# Patient Record
Sex: FEMALE | Race: WHITE | Employment: FULL TIME | URBAN - METROPOLITAN AREA
[De-identification: names, ages, dates, MRNs, and addresses within clinical notes are randomized per-mention and may not be internally consistent; named-entity substitution may affect disease eponyms.]

---

## 2017-02-17 PROBLEM — D69.6 THROMBOCYTOPENIA (HCC): Status: ACTIVE | Noted: 2017-02-17

## 2017-02-17 PROBLEM — D69.6 THROMBOCYTOPENIA: Status: ACTIVE | Noted: 2017-02-17

## 2017-02-27 PROCEDURE — 86304 IMMUNOASSAY TUMOR CA 125: CPT | Performed by: FAMILY MEDICINE

## 2017-03-07 PROCEDURE — 88305 TISSUE EXAM BY PATHOLOGIST: CPT | Performed by: FAMILY MEDICINE

## 2017-03-29 ENCOUNTER — APPOINTMENT (OUTPATIENT)
Dept: GENERAL RADIOLOGY | Facility: HOSPITAL | Age: 37
End: 2017-03-29
Attending: EMERGENCY MEDICINE
Payer: COMMERCIAL

## 2017-03-29 ENCOUNTER — HOSPITAL ENCOUNTER (EMERGENCY)
Facility: HOSPITAL | Age: 37
Discharge: HOME | End: 2017-03-29
Attending: EMERGENCY MEDICINE
Payer: COMMERCIAL

## 2017-03-29 VITALS
RESPIRATION RATE: 22 BRPM | DIASTOLIC BLOOD PRESSURE: 88 MMHG | WEIGHT: 220 LBS | HEART RATE: 94 BPM | OXYGEN SATURATION: 94 % | HEIGHT: 66 IN | TEMPERATURE: 98 F | BODY MASS INDEX: 35.36 KG/M2 | SYSTOLIC BLOOD PRESSURE: 142 MMHG

## 2017-03-29 DIAGNOSIS — F41.9 ANXIETY: ICD-10-CM

## 2017-03-29 DIAGNOSIS — R00.2 PALPITATIONS: Primary | ICD-10-CM

## 2017-03-29 LAB
ALBUMIN SERPL-MCNC: 4.1 G/DL (ref 3.5–4.8)
ALP LIVER SERPL-CCNC: 120 U/L (ref 37–98)
ALT SERPL-CCNC: 39 U/L (ref 14–54)
AST SERPL-CCNC: 27 U/L (ref 15–41)
BASOPHILS # BLD AUTO: 0.04 X10(3) UL (ref 0–0.1)
BASOPHILS NFR BLD AUTO: 0.6 %
BILIRUB SERPL-MCNC: 0.4 MG/DL (ref 0.1–2)
BUN BLD-MCNC: 11 MG/DL (ref 8–20)
CALCIUM BLD-MCNC: 9.3 MG/DL (ref 8.3–10.3)
CHLORIDE: 106 MMOL/L (ref 101–111)
CO2: 27 MMOL/L (ref 22–32)
CREAT BLD-MCNC: 0.85 MG/DL (ref 0.55–1.02)
EOSINOPHIL # BLD AUTO: 0.1 X10(3) UL (ref 0–0.3)
EOSINOPHIL NFR BLD AUTO: 1.5 %
ERYTHROCYTE [DISTWIDTH] IN BLOOD BY AUTOMATED COUNT: 12.3 % (ref 11.5–16)
GLUCOSE BLD-MCNC: 96 MG/DL (ref 70–99)
HCT VFR BLD AUTO: 44.2 % (ref 34–50)
HGB BLD-MCNC: 15.2 G/DL (ref 12–16)
IMMATURE GRANULOCYTE COUNT: 0.01 X10(3) UL (ref 0–1)
IMMATURE GRANULOCYTE RATIO %: 0.2 %
LYMPHOCYTES # BLD AUTO: 1.31 X10(3) UL (ref 0.9–4)
LYMPHOCYTES NFR BLD AUTO: 20 %
M PROTEIN MFR SERPL ELPH: 7.7 G/DL (ref 6.1–8.3)
MCH RBC QN AUTO: 29.8 PG (ref 27–33.2)
MCHC RBC AUTO-ENTMCNC: 34.4 G/DL (ref 31–37)
MCV RBC AUTO: 86.7 FL (ref 81–100)
MONOCYTES # BLD AUTO: 0.5 X10(3) UL (ref 0.1–0.6)
MONOCYTES NFR BLD AUTO: 7.6 %
NEUTROPHIL ABS PRELIM: 4.59 X10 (3) UL (ref 1.3–6.7)
NEUTROPHILS # BLD AUTO: 4.59 X10(3) UL (ref 1.3–6.7)
NEUTROPHILS NFR BLD AUTO: 70.1 %
PLATELET # BLD AUTO: 150 10(3)UL (ref 150–450)
POTASSIUM SERPL-SCNC: 3.9 MMOL/L (ref 3.6–5.1)
RBC # BLD AUTO: 5.1 X10(6)UL (ref 3.8–5.1)
RED CELL DISTRIBUTION WIDTH-SD: 38.7 FL (ref 35.1–46.3)
SODIUM SERPL-SCNC: 139 MMOL/L (ref 136–144)
TROPONIN: <0.046 NG/ML (ref ?–0.05)
TSI SER-ACNC: 1.12 MIU/ML (ref 0.35–5.5)
WBC # BLD AUTO: 6.6 X10(3) UL (ref 4–13)

## 2017-03-29 PROCEDURE — 93010 ELECTROCARDIOGRAM REPORT: CPT

## 2017-03-29 PROCEDURE — 80053 COMPREHEN METABOLIC PANEL: CPT | Performed by: EMERGENCY MEDICINE

## 2017-03-29 PROCEDURE — 93005 ELECTROCARDIOGRAM TRACING: CPT

## 2017-03-29 PROCEDURE — 99284 EMERGENCY DEPT VISIT MOD MDM: CPT

## 2017-03-29 PROCEDURE — 84484 ASSAY OF TROPONIN QUANT: CPT | Performed by: EMERGENCY MEDICINE

## 2017-03-29 PROCEDURE — 84443 ASSAY THYROID STIM HORMONE: CPT | Performed by: EMERGENCY MEDICINE

## 2017-03-29 PROCEDURE — 36415 COLL VENOUS BLD VENIPUNCTURE: CPT

## 2017-03-29 PROCEDURE — 85025 COMPLETE CBC W/AUTO DIFF WBC: CPT | Performed by: EMERGENCY MEDICINE

## 2017-03-29 RX ORDER — ALPRAZOLAM 0.25 MG/1
0.25 TABLET ORAL 3 TIMES DAILY PRN
Qty: 20 TABLET | Refills: 0 | Status: SHIPPED | OUTPATIENT
Start: 2017-03-29 | End: 2017-04-05

## 2017-03-29 NOTE — ED PROVIDER NOTES
Patient Seen in: [facility] Emergency Department    History   Patient presents with:  Arrythmia/Palpitations (cardiovascular)    Stated Complaint: palpitations    HPI    69-year-old female complaining of palpitations this patient is a  s • Breast Cancer Mother 35   • Prostate Cancer Maternal Grandfather    • Heart Surgery Maternal Grandfather      CABG   • Heart Disease Maternal Grandfather    • Dementia Paternal Grandmother    • Eye Problems Paternal Grandmother      macular degeneratio REFLEX TO FREE T4 - Normal   TROPONIN I - Normal   CBC WITH DIFFERENTIAL WITH PLATELET    Narrative: The following orders were created for panel order CBC WITH DIFFERENTIAL WITH PLATELET.   Procedure                               Abnormality         Sta

## 2017-03-29 NOTE — ED INITIAL ASSESSMENT (HPI)
Patient felt palpitations for approximately 5 minutes at about 3PM. She experienced some personal stress prior to this on her way to work. Patient took Claritin D at 12pm for \"sinus stuff. \" Has not taken this medication in a while.  She also drank about 4

## 2017-03-30 LAB
ATRIAL RATE: 115 BPM
P AXIS: 63 DEGREES
P-R INTERVAL: 162 MS
Q-T INTERVAL: 334 MS
QRS DURATION: 84 MS
QTC CALCULATION (BEZET): 462 MS
R AXIS: 33 DEGREES
T AXIS: -4 DEGREES
VENTRICULAR RATE: 115 BPM

## 2018-03-08 PROBLEM — K76.0 HEPATIC STEATOSIS: Status: ACTIVE | Noted: 2018-03-08

## 2020-11-30 ENCOUNTER — HOSPITAL ENCOUNTER (OUTPATIENT)
Age: 40
Discharge: HOME | End: 2020-11-30
Payer: OTHER GOVERNMENT

## 2020-11-30 VITALS
RESPIRATION RATE: 16 BRPM | HEART RATE: 90 BPM | OXYGEN SATURATION: 98 % | SYSTOLIC BLOOD PRESSURE: 132 MMHG | DIASTOLIC BLOOD PRESSURE: 85 MMHG | TEMPERATURE: 98 F

## 2020-11-30 DIAGNOSIS — B34.9 VIRAL SYNDROME: Primary | ICD-10-CM

## 2020-11-30 PROCEDURE — 99202 OFFICE O/P NEW SF 15 MIN: CPT | Performed by: NURSE PRACTITIONER

## 2020-12-01 NOTE — ED INITIAL ASSESSMENT (HPI)
Started with runny nose, congestion, and chills and aches yesterday. Had low grade fever on and off today, took some tylenol. Wants covid testing.

## 2020-12-01 NOTE — ED PROVIDER NOTES
Patient Seen in: [facility]mediate [facility]      History   Patient presents with:  Runny Nose  Nasal Congestion  Fever  Testing    Stated Complaint: testing-congestion,runny nose,cough    57-year-old female presents to the IC with complaints of congestion run testing-congestion,runny nose,cough  Other systems are as noted in HPI. Constitutional and vital signs reviewed. All other systems reviewed and negative except as noted above.     Physical Exam     ED Triage Vitals [11/30/20 1819]   /85   Pulse [id number]  [phone]                Medications Prescribed:  Current Discharge Medication List

## 2021-07-14 PROBLEM — R09.81 NASAL CONGESTION: Status: ACTIVE | Noted: 2021-07-14

## 2021-07-14 PROBLEM — R74.8 ELEVATED LIVER ENZYMES: Status: ACTIVE | Noted: 2021-07-14

## 2022-03-25 PROBLEM — C50.912 DUCTAL CARCINOMA OF LEFT BREAST (HCC): Status: ACTIVE | Noted: 2022-03-25

## 2022-04-03 PROBLEM — Z17.1 MALIGNANT NEOPLASM OF LEFT BREAST IN FEMALE, ESTROGEN RECEPTOR NEGATIVE: Status: ACTIVE | Noted: 2022-04-03

## 2022-04-03 PROBLEM — Z17.1 MALIGNANT NEOPLASM OF LEFT BREAST IN FEMALE, ESTROGEN RECEPTOR NEGATIVE (HCC): Status: ACTIVE | Noted: 2022-04-03

## 2022-04-03 PROBLEM — C50.912 MALIGNANT NEOPLASM OF LEFT BREAST IN FEMALE, ESTROGEN RECEPTOR NEGATIVE (HCC): Status: ACTIVE | Noted: 2022-04-03

## 2022-04-03 PROBLEM — C50.912 MALIGNANT NEOPLASM OF LEFT BREAST IN FEMALE, ESTROGEN RECEPTOR NEGATIVE: Status: ACTIVE | Noted: 2022-04-03

## 2022-08-17 ENCOUNTER — NURSE NAVIGATOR ENCOUNTER (OUTPATIENT)
Dept: HEMATOLOGY/ONCOLOGY | Facility: HOSPITAL | Age: 42
End: 2022-08-17

## 2022-08-17 NOTE — PROGRESS NOTES
Called back in regards to her VM she left about obtaining an appointment with Dr. Rafael Tam. 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 Chan Quinonez. She stated she is on 12 weeks of carboplatin currently. Her surgeon was Dr. Dipesh Rausch at [facility]. I instructed her that we need her medical records faxed to [phone] and her breast images from the last 5 years. She verbalized understanding. Pt was provided with the breast nurse navigators contact information and was encouraged to phone with any other questions or concerns.

## 2022-08-18 ENCOUNTER — NURSE NAVIGATOR ENCOUNTER (OUTPATIENT)
Dept: HEMATOLOGY/ONCOLOGY | Facility: HOSPITAL | Age: 42
End: 2022-08-18

## 2022-08-18 NOTE — PROGRESS NOTES
Called patient in regards to notifying her that I received the medical records she dropped off at the [facility]. She stated she is working on getting her breast images to the [facility] as well. I offered her an appointment with Dr. Martha Blackburn on Wednesday September 7, 2022 at [phone] in the [facility]. She thanked me for the phone call and assistance. Pt was provided with the breast nurse navigators contact information and was encouraged to phone with any other questions or concerns.

## 2022-08-22 ENCOUNTER — TELEPHONE (OUTPATIENT)
Dept: HEMATOLOGY/ONCOLOGY | Age: 42
End: 2022-08-22

## 2022-08-25 ENCOUNTER — TELEPHONE (OUTPATIENT)
Dept: HEMATOLOGY/ONCOLOGY | Age: 42
End: 2022-08-25

## 2022-08-25 NOTE — TELEPHONE ENCOUNTER
Patient calling. Wanted to know if there is an earlier date for consult.    Stated she would go to [facility] as well

## 2022-09-02 ENCOUNTER — NURSE NAVIGATOR ENCOUNTER (OUTPATIENT)
Dept: HEMATOLOGY/ONCOLOGY | Facility: HOSPITAL | Age: 42
End: 2022-09-02

## 2022-09-02 NOTE — PROGRESS NOTES
Spoke with patient on 9/1 regarding bringing in breast images, she brought in bone scan and CT abd/pelvis. Stated that she would bring the breast images on 9/2. Called patient numerous times on 9/2, did not receive these images by end of business day. Her appointment is 9/7 with Dr. Laith Aguilar and 9/13 with Dr. Bel Maxwell.

## 2022-09-07 ENCOUNTER — OFFICE VISIT (OUTPATIENT)
Dept: HEMATOLOGY/ONCOLOGY | Age: 42
End: 2022-09-07
Attending: SPECIALIST
Payer: COMMERCIAL

## 2022-09-07 ENCOUNTER — NURSE ONLY (OUTPATIENT)
Dept: HEMATOLOGY/ONCOLOGY | Age: 42
End: 2022-09-07
Attending: SPECIALIST
Payer: COMMERCIAL

## 2022-09-07 VITALS
RESPIRATION RATE: 18 BRPM | SYSTOLIC BLOOD PRESSURE: 128 MMHG | HEIGHT: 66.93 IN | OXYGEN SATURATION: 98 % | TEMPERATURE: 98 F | HEART RATE: 104 BPM | BODY MASS INDEX: 33.09 KG/M2 | WEIGHT: 210.81 LBS | DIASTOLIC BLOOD PRESSURE: 84 MMHG

## 2022-09-07 DIAGNOSIS — C50.919 TRIPLE NEGATIVE BREAST CANCER (HCC): ICD-10-CM

## 2022-09-07 DIAGNOSIS — Z17.1 MALIGNANT NEOPLASM OF UPPER-OUTER QUADRANT OF LEFT BREAST IN FEMALE, ESTROGEN RECEPTOR NEGATIVE (HCC): ICD-10-CM

## 2022-09-07 DIAGNOSIS — C50.412 MALIGNANT NEOPLASM OF UPPER-OUTER QUADRANT OF LEFT BREAST IN FEMALE, ESTROGEN RECEPTOR NEGATIVE (HCC): ICD-10-CM

## 2022-09-07 DIAGNOSIS — C50.412 MALIGNANT NEOPLASM OF UPPER-OUTER QUADRANT OF LEFT BREAST IN FEMALE, ESTROGEN RECEPTOR NEGATIVE (HCC): Primary | ICD-10-CM

## 2022-09-07 DIAGNOSIS — Z17.1 MALIGNANT NEOPLASM OF UPPER-OUTER QUADRANT OF LEFT BREAST IN FEMALE, ESTROGEN RECEPTOR NEGATIVE (HCC): Primary | ICD-10-CM

## 2022-09-07 PROBLEM — Z17.421 TRIPLE NEGATIVE BREAST CANCER (HCC): Status: ACTIVE | Noted: 2022-09-07

## 2022-09-07 LAB
ALBUMIN SERPL-MCNC: 3.9 G/DL (ref 3.4–5)
ALBUMIN/GLOB SERPL: 1.1 {RATIO} (ref 1–2)
ALP LIVER SERPL-CCNC: 110 U/L
ALT SERPL-CCNC: 47 U/L
ANION GAP SERPL CALC-SCNC: 6 MMOL/L (ref 0–18)
AST SERPL-CCNC: 21 U/L (ref 15–37)
BASOPHILS # BLD AUTO: 0.05 X10(3) UL (ref 0–0.2)
BASOPHILS NFR BLD AUTO: 0.6 %
BILIRUB SERPL-MCNC: 0.4 MG/DL (ref 0.1–2)
BUN BLD-MCNC: 14 MG/DL (ref 7–18)
CALCIUM BLD-MCNC: 9.2 MG/DL (ref 8.5–10.1)
CHLORIDE SERPL-SCNC: 107 MMOL/L (ref 98–112)
CO2 SERPL-SCNC: 28 MMOL/L (ref 21–32)
CREAT BLD-MCNC: 0.97 MG/DL
EOSINOPHIL # BLD AUTO: 1.05 X10(3) UL (ref 0–0.7)
EOSINOPHIL NFR BLD AUTO: 12.5 %
ERYTHROCYTE [DISTWIDTH] IN BLOOD BY AUTOMATED COUNT: 12 %
FASTING STATUS PATIENT QL REPORTED: NO
GFR SERPLBLD BASED ON 1.73 SQ M-ARVRAT: 74 ML/MIN/1.73M2 (ref 60–?)
GLOBULIN PLAS-MCNC: 3.4 G/DL (ref 2.8–4.4)
GLUCOSE BLD-MCNC: 134 MG/DL (ref 70–99)
HCT VFR BLD AUTO: 38.4 %
HGB BLD-MCNC: 12.9 G/DL
IMM GRANULOCYTES # BLD AUTO: 0.03 X10(3) UL (ref 0–1)
IMM GRANULOCYTES NFR BLD: 0.4 %
LYMPHOCYTES # BLD AUTO: 1.06 X10(3) UL (ref 1–4)
LYMPHOCYTES NFR BLD AUTO: 12.6 %
MCH RBC QN AUTO: 34 PG (ref 26–34)
MCHC RBC AUTO-ENTMCNC: 33.6 G/DL (ref 31–37)
MCV RBC AUTO: 101.3 FL
MONOCYTES # BLD AUTO: 0.62 X10(3) UL (ref 0.1–1)
MONOCYTES NFR BLD AUTO: 7.4 %
NEUTROPHILS # BLD AUTO: 5.59 X10 (3) UL (ref 1.5–7.7)
NEUTROPHILS # BLD AUTO: 5.59 X10(3) UL (ref 1.5–7.7)
NEUTROPHILS NFR BLD AUTO: 66.5 %
OSMOLALITY SERPL CALC.SUM OF ELEC: 294 MOSM/KG (ref 275–295)
PLATELET # BLD AUTO: 157 10(3)UL (ref 150–450)
POTASSIUM SERPL-SCNC: 3.8 MMOL/L (ref 3.5–5.1)
PROT SERPL-MCNC: 7.3 G/DL (ref 6.4–8.2)
RBC # BLD AUTO: 3.79 X10(6)UL
SODIUM SERPL-SCNC: 141 MMOL/L (ref 136–145)
TSI SER-ACNC: 0.95 MIU/ML (ref 0.36–3.74)
WBC # BLD AUTO: 8.4 X10(3) UL (ref 4–11)

## 2022-09-07 PROCEDURE — 99245 OFF/OP CONSLTJ NEW/EST HI 55: CPT | Performed by: SPECIALIST

## 2022-09-07 PROCEDURE — 85025 COMPLETE CBC W/AUTO DIFF WBC: CPT

## 2022-09-07 PROCEDURE — 80053 COMPREHEN METABOLIC PANEL: CPT

## 2022-09-07 PROCEDURE — 84443 ASSAY THYROID STIM HORMONE: CPT

## 2022-09-07 PROCEDURE — 36591 DRAW BLOOD OFF VENOUS DEVICE: CPT

## 2022-09-07 RX ORDER — LORAZEPAM 0.5 MG/1
0.5 TABLET ORAL EVERY 4 HOURS PRN
COMMUNITY

## 2022-09-07 RX ORDER — ONDANSETRON 8 MG/1
8 TABLET, ORALLY DISINTEGRATING ORAL EVERY 8 HOURS PRN
COMMUNITY

## 2022-09-07 RX ORDER — PROCHLORPERAZINE MALEATE 10 MG
10 TABLET ORAL EVERY 6 HOURS PRN
COMMUNITY

## 2022-09-07 RX ORDER — PANTOPRAZOLE SODIUM 40 MG/1
40 TABLET, DELAYED RELEASE ORAL DAILY
Qty: 30 TABLET | Refills: 1 | Status: SHIPPED | OUTPATIENT
Start: 2022-09-07

## 2022-09-07 RX ORDER — FAMOTIDINE 20 MG/1
20 TABLET, FILM COATED ORAL DAILY
COMMUNITY

## 2022-09-07 NOTE — PROGRESS NOTES
Patient is here today for follow Consult with Alli Marte for Breast Cancer. Patient denies pain. Medication list,medical history and toxicities were reviewed and updated. Education Record    Learner:  Patient and Mother    Disease / Diagnosis: Consult     Barriers / Limitations:  None   Comments:    Method:  Brief focused, Discussion, Printed material and Reinforcement   Comments:    General Topics:  Medication, Pain, Procedure and Plan of care reviewed   Comments:    Outcome:  Shows understanding   Comments:    Per Alli Marte - Consent for Demetris De La Fuente / Scarlett Trevino signed. [employer] today. Appointment for Chemo and follow up scheduled. AVS provided and follow up reviewed. Patient instructed to call as needed.

## 2022-09-08 ENCOUNTER — TELEPHONE (OUTPATIENT)
Dept: HEMATOLOGY/ONCOLOGY | Facility: HOSPITAL | Age: 42
End: 2022-09-08

## 2022-09-08 NOTE — TELEPHONE ENCOUNTER
Patient's insurance would not approve pegfilgrastim. Called for peer to peer appeal. 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 NCCN guidelines has a category 1 recommendation to initiate growth factor. Meliton Jimenes approved 9/13/22-12/6/22, auth # [id number].

## 2022-09-13 ENCOUNTER — OFFICE VISIT (OUTPATIENT)
Dept: HEMATOLOGY/ONCOLOGY | Facility: HOSPITAL | Age: 42
End: 2022-09-13
Attending: SPECIALIST
Payer: COMMERCIAL

## 2022-09-13 ENCOUNTER — SOCIAL WORK SERVICES (OUTPATIENT)
Dept: HEMATOLOGY/ONCOLOGY | Facility: HOSPITAL | Age: 42
End: 2022-09-13

## 2022-09-13 ENCOUNTER — OFFICE VISIT (OUTPATIENT)
Dept: SURGERY | Facility: CLINIC | Age: 42
End: 2022-09-13
Payer: COMMERCIAL

## 2022-09-13 VITALS
WEIGHT: 208 LBS | RESPIRATION RATE: 16 BRPM | BODY MASS INDEX: 32.65 KG/M2 | HEIGHT: 66.93 IN | TEMPERATURE: 99 F | HEART RATE: 89 BPM | OXYGEN SATURATION: 97 % | DIASTOLIC BLOOD PRESSURE: 84 MMHG | SYSTOLIC BLOOD PRESSURE: 143 MMHG

## 2022-09-13 VITALS
HEART RATE: 78 BPM | OXYGEN SATURATION: 97 % | WEIGHT: 211.38 LBS | RESPIRATION RATE: 16 BRPM | HEIGHT: 66.93 IN | TEMPERATURE: 98 F | SYSTOLIC BLOOD PRESSURE: 134 MMHG | BODY MASS INDEX: 33.18 KG/M2 | DIASTOLIC BLOOD PRESSURE: 84 MMHG

## 2022-09-13 DIAGNOSIS — Z17.1 MALIGNANT NEOPLASM OF UPPER-OUTER QUADRANT OF LEFT BREAST IN FEMALE, ESTROGEN RECEPTOR NEGATIVE (HCC): ICD-10-CM

## 2022-09-13 DIAGNOSIS — C50.919 TRIPLE NEGATIVE BREAST CANCER (HCC): Primary | ICD-10-CM

## 2022-09-13 DIAGNOSIS — C50.412 MALIGNANT NEOPLASM OF UPPER-OUTER QUADRANT OF LEFT BREAST IN FEMALE, ESTROGEN RECEPTOR NEGATIVE (HCC): ICD-10-CM

## 2022-09-13 PROCEDURE — 96375 TX/PRO/DX INJ NEW DRUG ADDON: CPT

## 2022-09-13 PROCEDURE — 96417 CHEMO IV INFUS EACH ADDL SEQ: CPT

## 2022-09-13 PROCEDURE — 99244 OFF/OP CNSLTJ NEW/EST MOD 40: CPT | Performed by: SURGERY

## 2022-09-13 PROCEDURE — 96367 TX/PROPH/DG ADDL SEQ IV INF: CPT

## 2022-09-13 PROCEDURE — 3079F DIAST BP 80-89 MM HG: CPT | Performed by: SURGERY

## 2022-09-13 PROCEDURE — 3008F BODY MASS INDEX DOCD: CPT | Performed by: SURGERY

## 2022-09-13 PROCEDURE — 96411 CHEMO IV PUSH ADDL DRUG: CPT

## 2022-09-13 PROCEDURE — 96413 CHEMO IV INFUSION 1 HR: CPT

## 2022-09-13 PROCEDURE — 3075F SYST BP GE 130 - 139MM HG: CPT | Performed by: SURGERY

## 2022-09-13 RX ORDER — DEXAMETHASONE 4 MG/1
8 TABLET ORAL 2 TIMES DAILY
Qty: 12 TABLET | Refills: 0 | Status: SHIPPED | OUTPATIENT
Start: 2022-09-13 | End: 2022-09-16

## 2022-09-13 RX ORDER — DOXORUBICIN HYDROCHLORIDE 2 MG/ML
60 INJECTION, SOLUTION INTRAVENOUS ONCE
Status: COMPLETED | OUTPATIENT
Start: 2022-09-13 | End: 2022-09-13

## 2022-09-13 RX ADMIN — DOXORUBICIN HYDROCHLORIDE 124 MG: 2 INJECTION, SOLUTION INTRAVENOUS at 15:55:00

## 2022-09-13 NOTE — PROGRESS NOTES
met with patient and Mother Chey in treatment room for introduction and role explanation. No Distress Screen on file. Patient started her treatment at [facility] and has transferred her care to Dr. Maryan Parr. She has had this treatment before, but is new to the [facility]. Patient shared that she is feeling good about her treatment, though struggled prior, which lead to her changing care. Patient states that she has a good support system. Her Boyfriend Phong Blackwood works more [address] [employer] in [facility], so stays there during the week, then comes back to assist at home during the weekends. She reports that she has great support with her Mom (stays with her to help care for Son). Her Son Ortega Traedwell is 8yo and reports that he is coping well. He attends a [email or web address] where the community is supportive and keep an eye out for any needs. Patient works as a , and is currently on [employer]. She is aware to provide Dr Sanford Rich information for any updates needed; contact provided.  educated on Power of  for [employer]; Patient stated that one has already been completed and will submit to [facility] to keep on file. Educated on available resources, providing Social Work Support Packet including [address], [employer], Transportation, and [address] contacts. Educated on [facility] services; patient open to discussion. REGINALD Blevins made aware and entered [facility] Referral for Singh Becker to follow up. Contact provided and family is aware to reach out with any needs. Bringing Estrella Bag given, which patient was grateful for.

## 2022-09-13 NOTE — PATIENT INSTRUCTIONS
Surgeon:         Dr. Joi Grace                                        Tel:         [phone]                                  Fax:        [phone]    Surgery/Procedure:        Immediate bilateral breast reconstruction with tissue expander placement and acellular dermal matrix  2 hours for Dr. Debra Eisenberg portion, 4 hours total, general anesthesia with preoperative pec block, joint with Dr. Rea Almeida, outpatient                                    Hospital:  [facility]: [address] [place], [place] [address]vd, [place], [address]           [phone]  [facility]: [address], [facility], [facility]               [phone]    1. Someone will need to drive you to and from the hospital if your procedure is outpatient. 2.Do not drink alcohol or smoke 24 hours prior to your procedure. 3. Bring a picture ID and your insurance card. 4. You will be contacted by the hospital the day before to confirm the procedure time and location. 5. The hospital will also contact you approximately one week before surgery to schedule your COVID test.     6. Do not take any herbal supplements or blood thinners at least one week before your procedure/surgery. This includes NSAID's (aspirin, baby aspirin, Motrin, Ibuprofen, Aleve, Advil, Naproxen, etc), Plavix, fish oil, vitamin E, turmeric, CoQ10, or green tea supplements, etc. *TYLENOL or acetaminophen is ok to take*    7. PRE-OPERATIVE TESTING: History and physical with medical clearance is REQUIRED within 30 days of the surgery date and is mandatory per Dr. Connie Gallagher. *If this is not done, your surgery will be postponed*  MEDICAL CLEARANCE WITH DR. Asim Cee  CBC  CMP  EKG    8. Please inform us if you develop any Covid-19 like symptoms, test positive or have been exposed for Covid- 19 prior to surgery.      Consent obtained  Photos taken on 9/13/2022

## 2022-09-13 NOTE — PROGRESS NOTES
Pt here for C6D1. Arrives Ambulating independently, accompanied by Family member           Pregnancy screening: Not applicable    Modifications in dose or schedule: No    Drugs/infusions dual verified for appearance and physical integrity. IV pump settings were dual verified: yes     Frequency of blood return and site check throughout administration: Prior to administration, Every 2-3 ml IVP and At completion of therapy   Discharged to Home, Ambulating independently, accompanied by:Family member    Outpatient Oncology Care Plan  Problem list:  fatigue  knowledge deficit  Problems related to:  chemotherapy  Interventions:  provided general teaching  Expected outcomes:  understands plan of care  Progress towards outcome:  making progress    Education Record    Learner:  Patient  Barriers / Limitations:  None  Method:  Discussion and Reinforcement  Outcome:  Shows understanding  Comments: Pt reported a small, almost resolved mouth sore on roof of her mouth. RN observed sore. Looked slightly red but almost healed. MD aware, okay to proceed with treatment today. Pt also requested steroids to take at home due to side effects experienced from treatment. MD prescribed. Pt instructed to  medications at pharmacy. Pt stated she felt \"warm\" on chest after a few minutes into the adriamycin infusion. Adriamycin was paused. CRISTA Harris called. At beside. Pt was not experiencing SOB, pain, or chest tightness. Resumed chemotherapy without incident. Pt tolerated rest of infusion.

## 2022-09-14 ENCOUNTER — OFFICE VISIT (OUTPATIENT)
Dept: HEMATOLOGY/ONCOLOGY | Age: 42
End: 2022-09-14
Attending: SPECIALIST
Payer: COMMERCIAL

## 2022-09-14 ENCOUNTER — NURSE NAVIGATOR ENCOUNTER (OUTPATIENT)
Dept: HEMATOLOGY/ONCOLOGY | Facility: HOSPITAL | Age: 42
End: 2022-09-14

## 2022-09-14 DIAGNOSIS — C50.919 TRIPLE NEGATIVE BREAST CANCER (HCC): Primary | ICD-10-CM

## 2022-09-14 DIAGNOSIS — Z17.1 MALIGNANT NEOPLASM OF UPPER-OUTER QUADRANT OF LEFT BREAST IN FEMALE, ESTROGEN RECEPTOR NEGATIVE (HCC): ICD-10-CM

## 2022-09-14 DIAGNOSIS — C50.412 MALIGNANT NEOPLASM OF UPPER-OUTER QUADRANT OF LEFT BREAST IN FEMALE, ESTROGEN RECEPTOR NEGATIVE (HCC): ICD-10-CM

## 2022-09-14 PROCEDURE — 96372 THER/PROPH/DIAG INJ SC/IM: CPT

## 2022-09-14 NOTE — PROGRESS NOTES
Called patient 1 day post chemo infusion. States that overall she is feeling better than expected, denies any nausea or fatigue. Is happy with her Jud Kaufman experience and the information that was received during infusion yesterday. Appreciative of my phone call. Will follow up with patient in person on Thursday during IV fluid infusion. Phone number provided and encouraged patient to call with any questions or concerns.

## 2022-09-15 ENCOUNTER — OFFICE VISIT (OUTPATIENT)
Dept: HEMATOLOGY/ONCOLOGY | Facility: HOSPITAL | Age: 42
End: 2022-09-15
Attending: SPECIALIST
Payer: COMMERCIAL

## 2022-09-15 ENCOUNTER — NURSE NAVIGATOR ENCOUNTER (OUTPATIENT)
Dept: HEMATOLOGY/ONCOLOGY | Facility: HOSPITAL | Age: 42
End: 2022-09-15

## 2022-09-15 VITALS
BODY MASS INDEX: 34 KG/M2 | DIASTOLIC BLOOD PRESSURE: 77 MMHG | RESPIRATION RATE: 16 BRPM | HEART RATE: 70 BPM | TEMPERATURE: 99 F | OXYGEN SATURATION: 96 % | WEIGHT: 214 LBS | SYSTOLIC BLOOD PRESSURE: 133 MMHG

## 2022-09-15 DIAGNOSIS — Z15.01 BRCA GENE POSITIVE: ICD-10-CM

## 2022-09-15 DIAGNOSIS — Z15.09 BRCA GENE POSITIVE: ICD-10-CM

## 2022-09-15 DIAGNOSIS — C50.919 TRIPLE NEGATIVE BREAST CANCER (HCC): Primary | ICD-10-CM

## 2022-09-15 DIAGNOSIS — Z51.11 CHEMOTHERAPY MANAGEMENT, ENCOUNTER FOR: ICD-10-CM

## 2022-09-15 PROCEDURE — 99215 OFFICE O/P EST HI 40 MIN: CPT | Performed by: SPECIALIST

## 2022-09-15 NOTE — PROGRESS NOTES
Went to infusion to check on patient, she was not there. Called and spoke with her, overall she is feeling better than expected and able to keep food down. States she can \"tell she had chemo\" but feels good. Will check back in with patient in 3 weeks after next chemo.

## 2022-09-20 ENCOUNTER — OFFICE VISIT (OUTPATIENT)
Dept: HEMATOLOGY/ONCOLOGY | Facility: HOSPITAL | Age: 42
End: 2022-09-20
Attending: SPECIALIST
Payer: COMMERCIAL

## 2022-09-29 ENCOUNTER — TELEPHONE (OUTPATIENT)
Dept: SURGERY | Facility: CLINIC | Age: 42
End: 2022-09-29

## 2022-09-29 DIAGNOSIS — C50.919 TRIPLE NEGATIVE BREAST CANCER (HCC): Primary | ICD-10-CM

## 2022-09-29 NOTE — TELEPHONE ENCOUNTER
Calling pt in regards to scheduling surgery. Informed pt that I have 11/28/2022 available at [facility] with Dr. Mary Agosto. Pt verbalized understanding and in agreement with date and location. All questions answered. Encouraged pt to call or [employer] message office with any other questions or concerns.

## 2022-10-03 RX ORDER — DOXORUBICIN HYDROCHLORIDE 2 MG/ML
60 INJECTION, SOLUTION INTRAVENOUS ONCE
Status: CANCELLED | OUTPATIENT
Start: 2022-10-04

## 2022-10-04 ENCOUNTER — OFFICE VISIT (OUTPATIENT)
Dept: HEMATOLOGY/ONCOLOGY | Facility: HOSPITAL | Age: 42
End: 2022-10-04
Attending: SPECIALIST
Payer: COMMERCIAL

## 2022-10-04 VITALS
TEMPERATURE: 97 F | SYSTOLIC BLOOD PRESSURE: 145 MMHG | HEART RATE: 79 BPM | OXYGEN SATURATION: 97 % | DIASTOLIC BLOOD PRESSURE: 80 MMHG | RESPIRATION RATE: 16 BRPM | WEIGHT: 212.81 LBS | HEIGHT: 66.93 IN | BODY MASS INDEX: 33.4 KG/M2

## 2022-10-04 DIAGNOSIS — C50.919 TRIPLE NEGATIVE BREAST CANCER (HCC): Primary | ICD-10-CM

## 2022-10-04 DIAGNOSIS — Z17.1 MALIGNANT NEOPLASM OF UPPER-OUTER QUADRANT OF LEFT BREAST IN FEMALE, ESTROGEN RECEPTOR NEGATIVE (HCC): ICD-10-CM

## 2022-10-04 DIAGNOSIS — C50.412 MALIGNANT NEOPLASM OF UPPER-OUTER QUADRANT OF LEFT BREAST IN FEMALE, ESTROGEN RECEPTOR NEGATIVE (HCC): ICD-10-CM

## 2022-10-04 DIAGNOSIS — Z15.01 BRCA GENE POSITIVE: ICD-10-CM

## 2022-10-04 DIAGNOSIS — Z51.11 CHEMOTHERAPY MANAGEMENT, ENCOUNTER FOR: ICD-10-CM

## 2022-10-04 DIAGNOSIS — Z15.09 BRCA GENE POSITIVE: ICD-10-CM

## 2022-10-04 LAB
ALBUMIN SERPL-MCNC: 3.5 G/DL (ref 3.4–5)
ALBUMIN/GLOB SERPL: 1 {RATIO} (ref 1–2)
ALP LIVER SERPL-CCNC: 88 U/L
ALT SERPL-CCNC: 40 U/L
ANION GAP SERPL CALC-SCNC: 4 MMOL/L (ref 0–18)
AST SERPL-CCNC: 14 U/L (ref 15–37)
BASOPHILS # BLD AUTO: 0.02 X10(3) UL (ref 0–0.2)
BASOPHILS NFR BLD AUTO: 0.5 %
BILIRUB SERPL-MCNC: 0.4 MG/DL (ref 0.1–2)
BUN BLD-MCNC: 15 MG/DL (ref 7–18)
CALCIUM BLD-MCNC: 9 MG/DL (ref 8.5–10.1)
CHLORIDE SERPL-SCNC: 109 MMOL/L (ref 98–112)
CO2 SERPL-SCNC: 27 MMOL/L (ref 21–32)
CREAT BLD-MCNC: 0.95 MG/DL
EOSINOPHIL # BLD AUTO: 0.01 X10(3) UL (ref 0–0.7)
EOSINOPHIL NFR BLD AUTO: 0.2 %
ERYTHROCYTE [DISTWIDTH] IN BLOOD BY AUTOMATED COUNT: 12.4 %
FASTING STATUS PATIENT QL REPORTED: NO
GFR SERPLBLD BASED ON 1.73 SQ M-ARVRAT: 76 ML/MIN/1.73M2 (ref 60–?)
GLOBULIN PLAS-MCNC: 3.4 G/DL (ref 2.8–4.4)
GLUCOSE BLD-MCNC: 142 MG/DL (ref 70–99)
HCT VFR BLD AUTO: 38.4 %
HGB BLD-MCNC: 12.9 G/DL
IMM GRANULOCYTES # BLD AUTO: 0.04 X10(3) UL (ref 0–1)
IMM GRANULOCYTES NFR BLD: 0.9 %
LYMPHOCYTES # BLD AUTO: 0.91 X10(3) UL (ref 1–4)
LYMPHOCYTES NFR BLD AUTO: 20.5 %
MCH RBC QN AUTO: 33.2 PG (ref 26–34)
MCHC RBC AUTO-ENTMCNC: 33.6 G/DL (ref 31–37)
MCV RBC AUTO: 99 FL
MONOCYTES # BLD AUTO: 0.58 X10(3) UL (ref 0.1–1)
MONOCYTES NFR BLD AUTO: 13.1 %
NEUTROPHILS # BLD AUTO: 2.88 X10 (3) UL (ref 1.5–7.7)
NEUTROPHILS # BLD AUTO: 2.88 X10(3) UL (ref 1.5–7.7)
NEUTROPHILS NFR BLD AUTO: 64.8 %
OSMOLALITY SERPL CALC.SUM OF ELEC: 293 MOSM/KG (ref 275–295)
PLATELET # BLD AUTO: 228 10(3)UL (ref 150–450)
POTASSIUM SERPL-SCNC: 3.7 MMOL/L (ref 3.5–5.1)
PROT SERPL-MCNC: 6.9 G/DL (ref 6.4–8.2)
RBC # BLD AUTO: 3.88 X10(6)UL
SODIUM SERPL-SCNC: 140 MMOL/L (ref 136–145)
TSI SER-ACNC: 1.3 MIU/ML (ref 0.36–3.74)
WBC # BLD AUTO: 4.4 X10(3) UL (ref 4–11)

## 2022-10-04 PROCEDURE — 99215 OFFICE O/P EST HI 40 MIN: CPT | Performed by: SPECIALIST

## 2022-10-04 PROCEDURE — 96413 CHEMO IV INFUSION 1 HR: CPT

## 2022-10-04 PROCEDURE — 96375 TX/PRO/DX INJ NEW DRUG ADDON: CPT

## 2022-10-04 PROCEDURE — 96417 CHEMO IV INFUS EACH ADDL SEQ: CPT

## 2022-10-04 PROCEDURE — 96367 TX/PROPH/DG ADDL SEQ IV INF: CPT

## 2022-10-04 PROCEDURE — 80053 COMPREHEN METABOLIC PANEL: CPT

## 2022-10-04 PROCEDURE — 84443 ASSAY THYROID STIM HORMONE: CPT

## 2022-10-04 PROCEDURE — 85025 COMPLETE CBC W/AUTO DIFF WBC: CPT

## 2022-10-04 PROCEDURE — 96411 CHEMO IV PUSH ADDL DRUG: CPT

## 2022-10-04 RX ORDER — DOXORUBICIN HYDROCHLORIDE 2 MG/ML
60 INJECTION, SOLUTION INTRAVENOUS ONCE
Status: COMPLETED | OUTPATIENT
Start: 2022-10-04 | End: 2022-10-04

## 2022-10-04 RX ORDER — DEXAMETHASONE 4 MG/1
8 TABLET ORAL 2 TIMES DAILY
Qty: 12 TABLET | Refills: 0 | Status: SHIPPED | OUTPATIENT
Start: 2022-10-04 | End: 2022-10-07

## 2022-10-04 RX ADMIN — DOXORUBICIN HYDROCHLORIDE 124 MG: 2 INJECTION, SOLUTION INTRAVENOUS at 10:54:00

## 2022-10-04 NOTE — PROGRESS NOTES
Pt here for C7D1 Keytruda, Adriamycin, Cytoxan. Arrives Ambulating independently, accompanied by Family member           Pregnancy screening: Denies possibility of pregnancy    Modifications in dose or schedule: No    Drugs/infusions dual verified for appearance and physical integrity.  IV pump settings were dual verified: yes     Frequency of blood return and site check throughout administration: Prior to administration, Prior to each drug, Every 2-3 ml IVP and At completion of therapy   Discharged to Home, Ambulating independently, accompanied by:Family member    Outpatient Oncology Care Plan  Problem list:  hair loss  fatigue  Problems related to:  chemotherapy  Interventions:  maintain safe environment  monitor for signs/symptoms of infection  Expected outcomes:  adequate sleep/rest  free of infection  optimal lab values  patient supported/coping well  safe in environment  symptoms relieved/minimized  Progress towards outcome:  making progress    Education Record    Learner:  Patient and Family Member  Barriers / Limitations:  None  Method:  Discussion  Outcome:  Shows understanding  Comments:

## 2022-10-05 ENCOUNTER — OFFICE VISIT (OUTPATIENT)
Dept: HEMATOLOGY/ONCOLOGY | Age: 42
End: 2022-10-05
Attending: SPECIALIST
Payer: COMMERCIAL

## 2022-10-05 DIAGNOSIS — C50.919 TRIPLE NEGATIVE BREAST CANCER (HCC): Primary | ICD-10-CM

## 2022-10-05 DIAGNOSIS — Z17.1 MALIGNANT NEOPLASM OF UPPER-OUTER QUADRANT OF LEFT BREAST IN FEMALE, ESTROGEN RECEPTOR NEGATIVE (HCC): ICD-10-CM

## 2022-10-05 DIAGNOSIS — C50.412 MALIGNANT NEOPLASM OF UPPER-OUTER QUADRANT OF LEFT BREAST IN FEMALE, ESTROGEN RECEPTOR NEGATIVE (HCC): ICD-10-CM

## 2022-10-05 PROCEDURE — 96372 THER/PROPH/DIAG INJ SC/IM: CPT

## 2022-10-09 ENCOUNTER — OFFICE VISIT (OUTPATIENT)
Dept: HEMATOLOGY/ONCOLOGY | Facility: HOSPITAL | Age: 42
End: 2022-10-09
Attending: SPECIALIST
Payer: COMMERCIAL

## 2022-10-09 DIAGNOSIS — C50.919 TRIPLE NEGATIVE BREAST CANCER (HCC): Primary | ICD-10-CM

## 2022-10-09 DIAGNOSIS — C50.412 MALIGNANT NEOPLASM OF UPPER-OUTER QUADRANT OF LEFT BREAST IN FEMALE, ESTROGEN RECEPTOR NEGATIVE (HCC): ICD-10-CM

## 2022-10-09 DIAGNOSIS — Z17.1 MALIGNANT NEOPLASM OF UPPER-OUTER QUADRANT OF LEFT BREAST IN FEMALE, ESTROGEN RECEPTOR NEGATIVE (HCC): ICD-10-CM

## 2022-10-11 ENCOUNTER — TELEPHONE (OUTPATIENT)
Dept: GENERAL RADIOLOGY | Facility: HOSPITAL | Age: 42
End: 2022-10-11

## 2022-10-24 ENCOUNTER — TELEPHONE (OUTPATIENT)
Dept: GENERAL RADIOLOGY | Facility: HOSPITAL | Age: 42
End: 2022-10-24

## 2022-10-24 NOTE — TELEPHONE ENCOUNTER
[facility] the breast care coordinator's call. Discussed sentinel lymph node mapping procedure to be done in the nuclear medicine department prior to surgery on Monday, November 28. Procedure explained and flow of the day on surgical date reviewed. All questions answered to the best of my ability. Encouraged Ms. Maria Teresa Sauer to contact Dr. Wanda Pineda if she has questions/concerns prior to surgery date. Charly Cleary verbalized understanding and gratitude for the call.

## 2022-10-25 ENCOUNTER — OFFICE VISIT (OUTPATIENT)
Dept: HEMATOLOGY/ONCOLOGY | Facility: HOSPITAL | Age: 42
End: 2022-10-25
Attending: SPECIALIST
Payer: COMMERCIAL

## 2022-10-25 VITALS
BODY MASS INDEX: 33.74 KG/M2 | SYSTOLIC BLOOD PRESSURE: 131 MMHG | TEMPERATURE: 98 F | RESPIRATION RATE: 18 BRPM | OXYGEN SATURATION: 99 % | WEIGHT: 215 LBS | HEIGHT: 66.93 IN | HEART RATE: 80 BPM | DIASTOLIC BLOOD PRESSURE: 85 MMHG

## 2022-10-25 DIAGNOSIS — Z17.1 MALIGNANT NEOPLASM OF UPPER-OUTER QUADRANT OF LEFT BREAST IN FEMALE, ESTROGEN RECEPTOR NEGATIVE (HCC): ICD-10-CM

## 2022-10-25 DIAGNOSIS — Z51.11 CHEMOTHERAPY MANAGEMENT, ENCOUNTER FOR: ICD-10-CM

## 2022-10-25 DIAGNOSIS — C50.412 MALIGNANT NEOPLASM OF UPPER-OUTER QUADRANT OF LEFT BREAST IN FEMALE, ESTROGEN RECEPTOR NEGATIVE (HCC): ICD-10-CM

## 2022-10-25 DIAGNOSIS — C50.919 TRIPLE NEGATIVE BREAST CANCER (HCC): Primary | ICD-10-CM

## 2022-10-25 DIAGNOSIS — Z15.09 BRCA GENE POSITIVE: ICD-10-CM

## 2022-10-25 DIAGNOSIS — Z15.01 BRCA GENE POSITIVE: ICD-10-CM

## 2022-10-25 DIAGNOSIS — K21.9 GERD WITHOUT ESOPHAGITIS: ICD-10-CM

## 2022-10-25 LAB
ALBUMIN SERPL-MCNC: 3.7 G/DL (ref 3.4–5)
ALBUMIN/GLOB SERPL: 1.1 {RATIO} (ref 1–2)
ALP LIVER SERPL-CCNC: 83 U/L
ALT SERPL-CCNC: 51 U/L
ANION GAP SERPL CALC-SCNC: 8 MMOL/L (ref 0–18)
AST SERPL-CCNC: 23 U/L (ref 15–37)
BASOPHILS # BLD AUTO: 0.02 X10(3) UL (ref 0–0.2)
BASOPHILS NFR BLD AUTO: 0.4 %
BILIRUB SERPL-MCNC: 0.4 MG/DL (ref 0.1–2)
BUN BLD-MCNC: 13 MG/DL (ref 7–18)
CALCIUM BLD-MCNC: 9.2 MG/DL (ref 8.5–10.1)
CHLORIDE SERPL-SCNC: 108 MMOL/L (ref 98–112)
CO2 SERPL-SCNC: 26 MMOL/L (ref 21–32)
CREAT BLD-MCNC: 0.88 MG/DL
EOSINOPHIL # BLD AUTO: 0.04 X10(3) UL (ref 0–0.7)
EOSINOPHIL NFR BLD AUTO: 0.8 %
ERYTHROCYTE [DISTWIDTH] IN BLOOD BY AUTOMATED COUNT: 13.8 %
FASTING STATUS PATIENT QL REPORTED: NO
GFR SERPLBLD BASED ON 1.73 SQ M-ARVRAT: 84 ML/MIN/1.73M2 (ref 60–?)
GLOBULIN PLAS-MCNC: 3.4 G/DL (ref 2.8–4.4)
GLUCOSE BLD-MCNC: 113 MG/DL (ref 70–99)
HCT VFR BLD AUTO: 36.3 %
HGB BLD-MCNC: 12.2 G/DL
IMM GRANULOCYTES # BLD AUTO: 0.02 X10(3) UL (ref 0–1)
IMM GRANULOCYTES NFR BLD: 0.4 %
LYMPHOCYTES # BLD AUTO: 0.76 X10(3) UL (ref 1–4)
LYMPHOCYTES NFR BLD AUTO: 15.7 %
MCH RBC QN AUTO: 32.8 PG (ref 26–34)
MCHC RBC AUTO-ENTMCNC: 33.6 G/DL (ref 31–37)
MCV RBC AUTO: 97.6 FL
MONOCYTES # BLD AUTO: 0.64 X10(3) UL (ref 0.1–1)
MONOCYTES NFR BLD AUTO: 13.3 %
NEUTROPHILS # BLD AUTO: 3.35 X10 (3) UL (ref 1.5–7.7)
NEUTROPHILS # BLD AUTO: 3.35 X10(3) UL (ref 1.5–7.7)
NEUTROPHILS NFR BLD AUTO: 69.4 %
OSMOLALITY SERPL CALC.SUM OF ELEC: 295 MOSM/KG (ref 275–295)
PLATELET # BLD AUTO: 205 10(3)UL (ref 150–450)
POTASSIUM SERPL-SCNC: 3.8 MMOL/L (ref 3.5–5.1)
PROT SERPL-MCNC: 7.1 G/DL (ref 6.4–8.2)
RBC # BLD AUTO: 3.72 X10(6)UL
SODIUM SERPL-SCNC: 142 MMOL/L (ref 136–145)
TSI SER-ACNC: 1.53 MIU/ML (ref 0.36–3.74)
WBC # BLD AUTO: 4.8 X10(3) UL (ref 4–11)

## 2022-10-25 PROCEDURE — 96417 CHEMO IV INFUS EACH ADDL SEQ: CPT

## 2022-10-25 PROCEDURE — 96367 TX/PROPH/DG ADDL SEQ IV INF: CPT

## 2022-10-25 PROCEDURE — 80053 COMPREHEN METABOLIC PANEL: CPT

## 2022-10-25 PROCEDURE — 96413 CHEMO IV INFUSION 1 HR: CPT

## 2022-10-25 PROCEDURE — 85025 COMPLETE CBC W/AUTO DIFF WBC: CPT

## 2022-10-25 PROCEDURE — 84443 ASSAY THYROID STIM HORMONE: CPT

## 2022-10-25 PROCEDURE — 96375 TX/PRO/DX INJ NEW DRUG ADDON: CPT

## 2022-10-25 PROCEDURE — 99215 OFFICE O/P EST HI 40 MIN: CPT | Performed by: SPECIALIST

## 2022-10-25 PROCEDURE — 96411 CHEMO IV PUSH ADDL DRUG: CPT

## 2022-10-25 RX ORDER — DEXAMETHASONE 4 MG/1
8 TABLET ORAL 2 TIMES DAILY
Qty: 12 TABLET | Refills: 0 | Status: SHIPPED | OUTPATIENT
Start: 2022-10-25 | End: 2022-10-28

## 2022-10-25 RX ORDER — DOXORUBICIN HYDROCHLORIDE 2 MG/ML
60 INJECTION, SOLUTION INTRAVENOUS ONCE
Status: CANCELLED | OUTPATIENT
Start: 2022-10-25

## 2022-10-25 RX ORDER — DOXORUBICIN HYDROCHLORIDE 2 MG/ML
60 INJECTION, SOLUTION INTRAVENOUS ONCE
Status: COMPLETED | OUTPATIENT
Start: 2022-10-25 | End: 2022-10-25

## 2022-10-25 RX ADMIN — DOXORUBICIN HYDROCHLORIDE 124 MG: 2 INJECTION, SOLUTION INTRAVENOUS at 11:22:00

## 2022-10-25 NOTE — PROGRESS NOTES
Pt here for C8D1 Keytruda/Benton/Cytoxan. Arrives Ambulating independently, accompanied by Family member           Pregnancy screening: Denies possibility of pregnancy    Modifications in dose or schedule: No    Drugs/infusions dual verified for appearance and physical integrity. IV pump settings were dual verified: yes     Frequency of blood return and site check throughout administration: Prior to administration, Every 2-3 ml IVP and At completion of therapy   Discharged to Home, Ambulating independently, accompanied by:Family member    Outpatient Oncology Care Plan  Problem list:  fatigue  Problems related to:  chemotherapy  disease/disease progression  side effect of treatment  Interventions:  maintain safe environment  encourage activity as tolerated  promoted rest  provided general teaching  Expected outcomes:  adequate sleep/rest  safe in environment  symptoms relieved/minimized  understands plan of care  Progress towards outcome:  making progress    Education Record    Learner:  Patient  Barriers / Limitations:  None  Method:  Brief focused and Reinforcement  Outcome:  Shows understanding  Comments: Patient tolerated chemotherapy and discharged in stable condition.

## 2022-10-26 ENCOUNTER — OFFICE VISIT (OUTPATIENT)
Dept: HEMATOLOGY/ONCOLOGY | Age: 42
End: 2022-10-26
Attending: SPECIALIST
Payer: COMMERCIAL

## 2022-10-26 DIAGNOSIS — C50.412 MALIGNANT NEOPLASM OF UPPER-OUTER QUADRANT OF LEFT BREAST IN FEMALE, ESTROGEN RECEPTOR NEGATIVE (HCC): ICD-10-CM

## 2022-10-26 DIAGNOSIS — C50.919 TRIPLE NEGATIVE BREAST CANCER (HCC): Primary | ICD-10-CM

## 2022-10-26 DIAGNOSIS — Z17.1 MALIGNANT NEOPLASM OF UPPER-OUTER QUADRANT OF LEFT BREAST IN FEMALE, ESTROGEN RECEPTOR NEGATIVE (HCC): ICD-10-CM

## 2022-10-26 PROCEDURE — 96372 THER/PROPH/DIAG INJ SC/IM: CPT

## 2022-11-15 ENCOUNTER — OFFICE VISIT (OUTPATIENT)
Dept: HEMATOLOGY/ONCOLOGY | Facility: HOSPITAL | Age: 42
End: 2022-11-15
Attending: SPECIALIST
Payer: COMMERCIAL

## 2022-11-15 VITALS
OXYGEN SATURATION: 96 % | BODY MASS INDEX: 34.16 KG/M2 | SYSTOLIC BLOOD PRESSURE: 132 MMHG | HEART RATE: 83 BPM | RESPIRATION RATE: 18 BRPM | TEMPERATURE: 98 F | WEIGHT: 217.63 LBS | DIASTOLIC BLOOD PRESSURE: 81 MMHG | HEIGHT: 66.93 IN

## 2022-11-15 DIAGNOSIS — C50.412 MALIGNANT NEOPLASM OF UPPER-OUTER QUADRANT OF LEFT BREAST IN FEMALE, ESTROGEN RECEPTOR NEGATIVE (HCC): ICD-10-CM

## 2022-11-15 DIAGNOSIS — K21.9 GERD WITHOUT ESOPHAGITIS: ICD-10-CM

## 2022-11-15 DIAGNOSIS — Z17.1 MALIGNANT NEOPLASM OF UPPER-OUTER QUADRANT OF LEFT BREAST IN FEMALE, ESTROGEN RECEPTOR NEGATIVE (HCC): ICD-10-CM

## 2022-11-15 DIAGNOSIS — Z15.01 BRCA GENE POSITIVE: ICD-10-CM

## 2022-11-15 DIAGNOSIS — C50.919 TRIPLE NEGATIVE BREAST CANCER (HCC): Primary | ICD-10-CM

## 2022-11-15 DIAGNOSIS — Z51.12 ENCOUNTER FOR ANTINEOPLASTIC IMMUNOTHERAPY: ICD-10-CM

## 2022-11-15 DIAGNOSIS — Z15.09 BRCA GENE POSITIVE: ICD-10-CM

## 2022-11-15 LAB
ALBUMIN SERPL-MCNC: 3.6 G/DL (ref 3.4–5)
ALBUMIN/GLOB SERPL: 1.1 {RATIO} (ref 1–2)
ALP LIVER SERPL-CCNC: 93 U/L
ALT SERPL-CCNC: 46 U/L
ANION GAP SERPL CALC-SCNC: 6 MMOL/L (ref 0–18)
AST SERPL-CCNC: 16 U/L (ref 15–37)
BASOPHILS # BLD AUTO: 0.02 X10(3) UL (ref 0–0.2)
BASOPHILS NFR BLD AUTO: 0.5 %
BILIRUB SERPL-MCNC: 0.4 MG/DL (ref 0.1–2)
BUN BLD-MCNC: 14 MG/DL (ref 7–18)
CALCIUM BLD-MCNC: 9.3 MG/DL (ref 8.5–10.1)
CHLORIDE SERPL-SCNC: 109 MMOL/L (ref 98–112)
CO2 SERPL-SCNC: 26 MMOL/L (ref 21–32)
CREAT BLD-MCNC: 0.91 MG/DL
EOSINOPHIL # BLD AUTO: 0.03 X10(3) UL (ref 0–0.7)
EOSINOPHIL NFR BLD AUTO: 0.7 %
ERYTHROCYTE [DISTWIDTH] IN BLOOD BY AUTOMATED COUNT: 15.9 %
GFR SERPLBLD BASED ON 1.73 SQ M-ARVRAT: 80 ML/MIN/1.73M2 (ref 60–?)
GLOBULIN PLAS-MCNC: 3.4 G/DL (ref 2.8–4.4)
GLUCOSE BLD-MCNC: 124 MG/DL (ref 70–99)
HCT VFR BLD AUTO: 33.7 %
HGB BLD-MCNC: 11.4 G/DL
IMM GRANULOCYTES # BLD AUTO: 0.02 X10(3) UL (ref 0–1)
IMM GRANULOCYTES NFR BLD: 0.5 %
LYMPHOCYTES # BLD AUTO: 0.65 X10(3) UL (ref 1–4)
LYMPHOCYTES NFR BLD AUTO: 15.9 %
MCH RBC QN AUTO: 33.1 PG (ref 26–34)
MCHC RBC AUTO-ENTMCNC: 33.8 G/DL (ref 31–37)
MCV RBC AUTO: 98 FL
MONOCYTES # BLD AUTO: 0.58 X10(3) UL (ref 0.1–1)
MONOCYTES NFR BLD AUTO: 14.2 %
NEUTROPHILS # BLD AUTO: 2.78 X10 (3) UL (ref 1.5–7.7)
NEUTROPHILS # BLD AUTO: 2.78 X10(3) UL (ref 1.5–7.7)
NEUTROPHILS NFR BLD AUTO: 68.2 %
OSMOLALITY SERPL CALC.SUM OF ELEC: 294 MOSM/KG (ref 275–295)
PLATELET # BLD AUTO: 210 10(3)UL (ref 150–450)
POTASSIUM SERPL-SCNC: 3.6 MMOL/L (ref 3.5–5.1)
PROT SERPL-MCNC: 7 G/DL (ref 6.4–8.2)
RBC # BLD AUTO: 3.44 X10(6)UL
SODIUM SERPL-SCNC: 141 MMOL/L (ref 136–145)
TSI SER-ACNC: 1.19 MIU/ML (ref 0.36–3.74)
WBC # BLD AUTO: 4.1 X10(3) UL (ref 4–11)

## 2022-11-15 PROCEDURE — 85025 COMPLETE CBC W/AUTO DIFF WBC: CPT

## 2022-11-15 PROCEDURE — 84443 ASSAY THYROID STIM HORMONE: CPT

## 2022-11-15 PROCEDURE — 80053 COMPREHEN METABOLIC PANEL: CPT

## 2022-11-15 PROCEDURE — 99215 OFFICE O/P EST HI 40 MIN: CPT | Performed by: SPECIALIST

## 2022-11-15 PROCEDURE — 96413 CHEMO IV INFUSION 1 HR: CPT

## 2022-11-15 RX ORDER — PANTOPRAZOLE SODIUM 40 MG/1
40 TABLET, DELAYED RELEASE ORAL DAILY
Qty: 30 TABLET | Refills: 5 | Status: SHIPPED | OUTPATIENT
Start: 2022-11-15

## 2022-11-15 NOTE — PROGRESS NOTES
Patient is here today for follow up with Iban Bañuelos for Triple Negative Breast Cancer. C9D1 Keytruda therapy. Patient denies pain. Denies adverse side effects to Immunotherapy. No rash, itching, cough, shortness of breath, diarrhea or other GI issues. Medication list,medical history and toxicities were reviewed and updated. Education Record    Learner:  Patient      Disease / Diagnosis: Triple Negative Breast Cancer    Barriers / Limitations:  None   Comments:    Method:  Brief focused, Discussion, Printed material and Reinforcement   Comments:    General Topics:  Medication, Pain, Procedure and Plan of care reviewed   Comments:    Outcome:  Shows understanding   Comments:    Post MD visit patient sent to waiting room. Treating RN notified. AVS provided and follow up reviewed. Patient instructed to call as needed.

## 2022-11-15 NOTE — PROGRESS NOTES
Pt here for C9D1. Arrives Ambulating independently, accompanied by Self           Pregnancy screening: Not applicable    Modifications in dose or schedule: No    Drugs/infusions dual verified for appearance and physical integrity. IV pump settings were dual verified: yes     Frequency of blood return and site check throughout administration: Prior to administration, at completion of therapy  Discharged to Home, Ambulating independently, accompanied by:Self    Outpatient Oncology Care Plan  Problem list:  fatigue  knowledge deficit  Problems related to:  chemotherapy  Interventions:  provided general teaching  Expected outcomes:  understands plan of care  Progress towards outcome:  making progress    Education Record    Learner:  Patient  Barriers / Limitations:  None  Method:  Discussion and Reinforcement  Outcome:  Shows understanding  Comments: Pt tolerated infusion. Pt left in stable condition.

## 2022-11-18 ENCOUNTER — OFFICE VISIT (OUTPATIENT)
Dept: HEMATOLOGY/ONCOLOGY | Facility: HOSPITAL | Age: 42
End: 2022-11-18
Attending: SPECIALIST
Payer: COMMERCIAL

## 2022-11-18 VITALS
HEART RATE: 87 BPM | DIASTOLIC BLOOD PRESSURE: 84 MMHG | WEIGHT: 218 LBS | RESPIRATION RATE: 16 BRPM | BODY MASS INDEX: 34.21 KG/M2 | OXYGEN SATURATION: 98 % | SYSTOLIC BLOOD PRESSURE: 138 MMHG | HEIGHT: 66.93 IN | TEMPERATURE: 98 F

## 2022-11-18 DIAGNOSIS — Z15.01 BRCA GENE POSITIVE: ICD-10-CM

## 2022-11-18 DIAGNOSIS — C50.919 TRIPLE NEGATIVE BREAST CANCER (HCC): Primary | ICD-10-CM

## 2022-11-18 DIAGNOSIS — K21.9 GERD WITHOUT ESOPHAGITIS: ICD-10-CM

## 2022-11-18 DIAGNOSIS — Z15.09 BRCA GENE POSITIVE: ICD-10-CM

## 2022-11-18 PROCEDURE — 99214 OFFICE O/P EST MOD 30 MIN: CPT | Performed by: SPECIALIST

## 2022-11-21 DIAGNOSIS — C50.919 TRIPLE NEGATIVE BREAST CANCER (HCC): Primary | ICD-10-CM

## 2022-11-22 ENCOUNTER — NURSE NAVIGATOR ENCOUNTER (OUTPATIENT)
Dept: HEMATOLOGY/ONCOLOGY | Facility: HOSPITAL | Age: 42
End: 2022-11-22

## 2022-11-22 NOTE — PROGRESS NOTES
Patient is scheduled for mastectomy with Dr. Marie Liu, and cancelled portion with Dr. Samira Solano. Called her to confirm this and then also cancelled mastectomy class, as this is for patient's with an [address]. She is aware.

## 2022-11-23 ENCOUNTER — APPOINTMENT (OUTPATIENT)
Dept: HEMATOLOGY/ONCOLOGY | Facility: HOSPITAL | Age: 42
End: 2022-11-23
Attending: SPECIALIST
Payer: COMMERCIAL

## 2022-11-25 ENCOUNTER — LAB ENCOUNTER (OUTPATIENT)
Dept: LAB | Age: 42
End: 2022-11-25
Attending: SURGERY
Payer: COMMERCIAL

## 2022-11-25 DIAGNOSIS — Z20.822 ENCOUNTER FOR PREPROCEDURE SCREENING LABORATORY TESTING FOR COVID-19: ICD-10-CM

## 2022-11-25 DIAGNOSIS — Z01.812 ENCOUNTER FOR PREPROCEDURE SCREENING LABORATORY TESTING FOR COVID-19: ICD-10-CM

## 2022-11-26 LAB — SARS-COV-2 RNA RESP QL NAA+PROBE: NOT DETECTED

## 2022-11-27 ENCOUNTER — ANESTHESIA EVENT (OUTPATIENT)
Dept: SURGERY | Facility: HOSPITAL | Age: 42
End: 2022-11-27
Payer: COMMERCIAL

## 2022-11-28 ENCOUNTER — ANESTHESIA (OUTPATIENT)
Dept: SURGERY | Facility: HOSPITAL | Age: 42
End: 2022-11-28
Payer: COMMERCIAL

## 2022-11-28 ENCOUNTER — HOSPITAL ENCOUNTER (OUTPATIENT)
Facility: HOSPITAL | Age: 42
Discharge: HOME | End: 2022-11-29
Attending: SURGERY | Admitting: SURGERY
Payer: COMMERCIAL

## 2022-11-28 ENCOUNTER — HOSPITAL ENCOUNTER (OUTPATIENT)
Dept: NUCLEAR MEDICINE | Facility: HOSPITAL | Age: 42
Discharge: HOME | End: 2022-11-28
Attending: SURGERY
Payer: COMMERCIAL

## 2022-11-28 ENCOUNTER — APPOINTMENT (OUTPATIENT)
Dept: MAMMOGRAPHY | Facility: HOSPITAL | Age: 42
End: 2022-11-28
Attending: SURGERY
Payer: COMMERCIAL

## 2022-11-28 DIAGNOSIS — Z20.822 ENCOUNTER FOR PREPROCEDURE SCREENING LABORATORY TESTING FOR COVID-19: Primary | ICD-10-CM

## 2022-11-28 DIAGNOSIS — C50.412 MALIGNANT NEOPLASM OF UPPER-OUTER QUADRANT OF LEFT BREAST IN FEMALE, ESTROGEN RECEPTOR NEGATIVE (HCC): ICD-10-CM

## 2022-11-28 DIAGNOSIS — Z17.1 MALIGNANT NEOPLASM OF UPPER-OUTER QUADRANT OF LEFT BREAST IN FEMALE, ESTROGEN RECEPTOR NEGATIVE (HCC): ICD-10-CM

## 2022-11-28 DIAGNOSIS — Z09 SURGERY FOLLOW-UP: ICD-10-CM

## 2022-11-28 DIAGNOSIS — Z01.812 ENCOUNTER FOR PREPROCEDURE SCREENING LABORATORY TESTING FOR COVID-19: Primary | ICD-10-CM

## 2022-11-28 PROCEDURE — 88307 TISSUE EXAM BY PATHOLOGIST: CPT | Performed by: SURGERY

## 2022-11-28 PROCEDURE — 07B60ZX EXCISION OF LEFT AXILLARY LYMPHATIC, OPEN APPROACH, DIAGNOSTIC: ICD-10-PCS | Performed by: SURGERY

## 2022-11-28 PROCEDURE — 76098 X-RAY EXAM SURGICAL SPECIMEN: CPT | Performed by: SURGERY

## 2022-11-28 PROCEDURE — 0HTV0ZZ RESECTION OF BILATERAL BREAST, OPEN APPROACH: ICD-10-PCS | Performed by: SURGERY

## 2022-11-28 PROCEDURE — 78195 LYMPH SYSTEM IMAGING: CPT | Performed by: SURGERY

## 2022-11-28 PROCEDURE — 3E0W3KZ INTRODUCTION OF OTHER DIAGNOSTIC SUBSTANCE INTO LYMPHATICS, PERCUTANEOUS APPROACH: ICD-10-PCS | Performed by: SURGERY

## 2022-11-28 PROCEDURE — BH01ZZZ PLAIN RADIOGRAPHY OF LEFT BREAST: ICD-10-PCS | Performed by: SURGERY

## 2022-11-28 PROCEDURE — 88331 PATH CONSLTJ SURG 1 BLK 1SPC: CPT | Performed by: SURGERY

## 2022-11-28 RX ORDER — HYDROCODONE BITARTRATE AND ACETAMINOPHEN 5; 325 MG/1; MG/1
1 TABLET ORAL ONCE AS NEEDED
Status: DISCONTINUED | OUTPATIENT
Start: 2022-11-28 | End: 2022-11-28 | Stop reason: HOSPADM

## 2022-11-28 RX ORDER — DIPHENHYDRAMINE HCL 25 MG
25 CAPSULE ORAL NIGHTLY PRN
Status: DISCONTINUED | OUTPATIENT
Start: 2022-11-28 | End: 2022-11-29

## 2022-11-28 RX ORDER — ROCURONIUM BROMIDE 10 MG/ML
INJECTION, SOLUTION INTRAVENOUS AS NEEDED
Status: DISCONTINUED | OUTPATIENT
Start: 2022-11-28 | End: 2022-11-28 | Stop reason: SURG

## 2022-11-28 RX ORDER — ONDANSETRON 2 MG/ML
INJECTION INTRAMUSCULAR; INTRAVENOUS
Status: COMPLETED
Start: 2022-11-28 | End: 2022-11-28

## 2022-11-28 RX ORDER — MORPHINE SULFATE 2 MG/ML
1 INJECTION, SOLUTION INTRAMUSCULAR; INTRAVENOUS EVERY 2 HOUR PRN
Status: DISCONTINUED | OUTPATIENT
Start: 2022-11-28 | End: 2022-11-29

## 2022-11-28 RX ORDER — POLYETHYLENE GLYCOL 3350 17 G/17G
17 POWDER, FOR SOLUTION ORAL DAILY PRN
Status: DISCONTINUED | OUTPATIENT
Start: 2022-11-28 | End: 2022-11-29

## 2022-11-28 RX ORDER — ACETAMINOPHEN 500 MG
1000 TABLET ORAL ONCE AS NEEDED
Status: DISCONTINUED | OUTPATIENT
Start: 2022-11-28 | End: 2022-11-28 | Stop reason: HOSPADM

## 2022-11-28 RX ORDER — NALOXONE HYDROCHLORIDE 0.4 MG/ML
80 INJECTION, SOLUTION INTRAMUSCULAR; INTRAVENOUS; SUBCUTANEOUS AS NEEDED
Status: DISCONTINUED | OUTPATIENT
Start: 2022-11-28 | End: 2022-11-28 | Stop reason: HOSPADM

## 2022-11-28 RX ORDER — SODIUM CHLORIDE, SODIUM LACTATE, POTASSIUM CHLORIDE, CALCIUM CHLORIDE 600; 310; 30; 20 MG/100ML; MG/100ML; MG/100ML; MG/100ML
INJECTION, SOLUTION INTRAVENOUS CONTINUOUS
Status: DISCONTINUED | OUTPATIENT
Start: 2022-11-28 | End: 2022-11-28 | Stop reason: HOSPADM

## 2022-11-28 RX ORDER — ONDANSETRON 2 MG/ML
4 INJECTION INTRAMUSCULAR; INTRAVENOUS EVERY 6 HOURS PRN
Status: DISCONTINUED | OUTPATIENT
Start: 2022-11-28 | End: 2022-11-28 | Stop reason: HOSPADM

## 2022-11-28 RX ORDER — DIAZEPAM 5 MG/1
5 TABLET ORAL AS NEEDED
Status: DISCONTINUED | OUTPATIENT
Start: 2022-11-28 | End: 2022-11-28 | Stop reason: HOSPADM

## 2022-11-28 RX ORDER — PANTOPRAZOLE SODIUM 40 MG/1
40 TABLET, DELAYED RELEASE ORAL DAILY
Status: DISCONTINUED | OUTPATIENT
Start: 2022-11-29 | End: 2022-11-29

## 2022-11-28 RX ORDER — HYDROMORPHONE HYDROCHLORIDE 1 MG/ML
0.2 INJECTION, SOLUTION INTRAMUSCULAR; INTRAVENOUS; SUBCUTANEOUS EVERY 5 MIN PRN
Status: DISCONTINUED | OUTPATIENT
Start: 2022-11-28 | End: 2022-11-28 | Stop reason: HOSPADM

## 2022-11-28 RX ORDER — SODIUM PHOSPHATE, DIBASIC AND SODIUM PHOSPHATE, MONOBASIC 7; 19 G/133ML; G/133ML
1 ENEMA RECTAL ONCE AS NEEDED
Status: DISCONTINUED | OUTPATIENT
Start: 2022-11-28 | End: 2022-11-29

## 2022-11-28 RX ORDER — MORPHINE SULFATE 2 MG/ML
2 INJECTION, SOLUTION INTRAMUSCULAR; INTRAVENOUS EVERY 2 HOUR PRN
Status: DISCONTINUED | OUTPATIENT
Start: 2022-11-28 | End: 2022-11-29

## 2022-11-28 RX ORDER — BISACODYL 10 MG
10 SUPPOSITORY, RECTAL RECTAL
Status: DISCONTINUED | OUTPATIENT
Start: 2022-11-28 | End: 2022-11-29

## 2022-11-28 RX ORDER — ISOSULFAN BLUE 50 MG/5ML
INJECTION, SOLUTION SUBCUTANEOUS AS NEEDED
Status: DISCONTINUED | OUTPATIENT
Start: 2022-11-28 | End: 2022-11-28 | Stop reason: HOSPADM

## 2022-11-28 RX ORDER — MELATONIN
3 NIGHTLY PRN
Status: DISCONTINUED | OUTPATIENT
Start: 2022-11-28 | End: 2022-11-29

## 2022-11-28 RX ORDER — METOCLOPRAMIDE HYDROCHLORIDE 5 MG/ML
10 INJECTION INTRAMUSCULAR; INTRAVENOUS EVERY 6 HOURS PRN
Status: DISCONTINUED | OUTPATIENT
Start: 2022-11-28 | End: 2022-11-29

## 2022-11-28 RX ORDER — NEOSTIGMINE METHYLSULFATE 1 MG/ML
INJECTION, SOLUTION INTRAVENOUS AS NEEDED
Status: DISCONTINUED | OUTPATIENT
Start: 2022-11-28 | End: 2022-11-28 | Stop reason: SURG

## 2022-11-28 RX ORDER — LIDOCAINE HYDROCHLORIDE 10 MG/ML
INJECTION, SOLUTION EPIDURAL; INFILTRATION; INTRACAUDAL; PERINEURAL AS NEEDED
Status: DISCONTINUED | OUTPATIENT
Start: 2022-11-28 | End: 2022-11-28 | Stop reason: SURG

## 2022-11-28 RX ORDER — HYDROCODONE BITARTRATE AND ACETAMINOPHEN 5; 325 MG/1; MG/1
2 TABLET ORAL ONCE AS NEEDED
Status: DISCONTINUED | OUTPATIENT
Start: 2022-11-28 | End: 2022-11-28 | Stop reason: HOSPADM

## 2022-11-28 RX ORDER — KETOROLAC TROMETHAMINE 30 MG/ML
30 INJECTION, SOLUTION INTRAMUSCULAR; INTRAVENOUS EVERY 6 HOURS PRN
Status: DISCONTINUED | OUTPATIENT
Start: 2022-11-28 | End: 2022-11-29

## 2022-11-28 RX ORDER — HEPARIN SODIUM 5000 [USP'U]/ML
5000 INJECTION, SOLUTION INTRAVENOUS; SUBCUTANEOUS ONCE
Status: COMPLETED | OUTPATIENT
Start: 2022-11-28 | End: 2022-11-28

## 2022-11-28 RX ORDER — ACETAMINOPHEN 500 MG
1000 TABLET ORAL ONCE
Status: DISCONTINUED | OUTPATIENT
Start: 2022-11-28 | End: 2022-11-28 | Stop reason: HOSPADM

## 2022-11-28 RX ORDER — LORAZEPAM 2 MG/ML
1 INJECTION INTRAMUSCULAR ONCE
Status: DISCONTINUED | OUTPATIENT
Start: 2022-11-28 | End: 2022-11-29

## 2022-11-28 RX ORDER — SODIUM CHLORIDE, SODIUM LACTATE, POTASSIUM CHLORIDE, CALCIUM CHLORIDE 600; 310; 30; 20 MG/100ML; MG/100ML; MG/100ML; MG/100ML
INJECTION, SOLUTION INTRAVENOUS CONTINUOUS
Status: DISCONTINUED | OUTPATIENT
Start: 2022-11-28 | End: 2022-11-29

## 2022-11-28 RX ORDER — ACETAMINOPHEN 500 MG
500 TABLET ORAL EVERY 6 HOURS PRN
Status: DISCONTINUED | OUTPATIENT
Start: 2022-11-28 | End: 2022-11-29

## 2022-11-28 RX ORDER — MORPHINE SULFATE 2 MG/ML
0.5 INJECTION, SOLUTION INTRAMUSCULAR; INTRAVENOUS EVERY 2 HOUR PRN
Status: DISCONTINUED | OUTPATIENT
Start: 2022-11-28 | End: 2022-11-29

## 2022-11-28 RX ORDER — HYDROMORPHONE HYDROCHLORIDE 1 MG/ML
0.6 INJECTION, SOLUTION INTRAMUSCULAR; INTRAVENOUS; SUBCUTANEOUS EVERY 5 MIN PRN
Status: DISCONTINUED | OUTPATIENT
Start: 2022-11-28 | End: 2022-11-28 | Stop reason: HOSPADM

## 2022-11-28 RX ORDER — ONDANSETRON 2 MG/ML
4 INJECTION INTRAMUSCULAR; INTRAVENOUS EVERY 6 HOURS PRN
Status: DISCONTINUED | OUTPATIENT
Start: 2022-11-28 | End: 2022-11-29

## 2022-11-28 RX ORDER — DEXAMETHASONE SODIUM PHOSPHATE 4 MG/ML
VIAL (ML) INJECTION AS NEEDED
Status: DISCONTINUED | OUTPATIENT
Start: 2022-11-28 | End: 2022-11-28 | Stop reason: SURG

## 2022-11-28 RX ORDER — CLINDAMYCIN PHOSPHATE 900 MG/50ML
900 INJECTION INTRAVENOUS ONCE
Status: COMPLETED | OUTPATIENT
Start: 2022-11-28 | End: 2022-11-28

## 2022-11-28 RX ORDER — HYDROMORPHONE HYDROCHLORIDE 1 MG/ML
INJECTION, SOLUTION INTRAMUSCULAR; INTRAVENOUS; SUBCUTANEOUS
Status: DISCONTINUED
Start: 2022-11-28 | End: 2022-11-28 | Stop reason: WASHOUT

## 2022-11-28 RX ORDER — SENNOSIDES 8.6 MG
17.2 TABLET ORAL NIGHTLY PRN
Status: DISCONTINUED | OUTPATIENT
Start: 2022-11-28 | End: 2022-11-29

## 2022-11-28 RX ORDER — HYDROMORPHONE HYDROCHLORIDE 1 MG/ML
0.4 INJECTION, SOLUTION INTRAMUSCULAR; INTRAVENOUS; SUBCUTANEOUS EVERY 5 MIN PRN
Status: DISCONTINUED | OUTPATIENT
Start: 2022-11-28 | End: 2022-11-28 | Stop reason: HOSPADM

## 2022-11-28 RX ORDER — KETOROLAC TROMETHAMINE 15 MG/ML
15 INJECTION, SOLUTION INTRAMUSCULAR; INTRAVENOUS EVERY 6 HOURS PRN
Status: DISCONTINUED | OUTPATIENT
Start: 2022-11-28 | End: 2022-11-29

## 2022-11-28 RX ORDER — METOCLOPRAMIDE 10 MG/1
10 TABLET ORAL EVERY 6 HOURS PRN
Status: DISCONTINUED | OUTPATIENT
Start: 2022-11-28 | End: 2022-11-29

## 2022-11-28 RX ORDER — TRAMADOL HYDROCHLORIDE 50 MG/1
50 TABLET ORAL EVERY 6 HOURS SCHEDULED
Status: DISCONTINUED | OUTPATIENT
Start: 2022-11-28 | End: 2022-11-29

## 2022-11-28 RX ORDER — GLYCOPYRROLATE 0.2 MG/ML
INJECTION, SOLUTION INTRAMUSCULAR; INTRAVENOUS AS NEEDED
Status: DISCONTINUED | OUTPATIENT
Start: 2022-11-28 | End: 2022-11-28 | Stop reason: SURG

## 2022-11-28 RX ORDER — ONDANSETRON 2 MG/ML
INJECTION INTRAMUSCULAR; INTRAVENOUS AS NEEDED
Status: DISCONTINUED | OUTPATIENT
Start: 2022-11-28 | End: 2022-11-28 | Stop reason: SURG

## 2022-11-28 RX ORDER — LIDOCAINE AND PRILOCAINE 25; 25 MG/G; MG/G
CREAM TOPICAL ONCE
Status: COMPLETED | OUTPATIENT
Start: 2022-11-28 | End: 2022-11-28

## 2022-11-28 RX ADMIN — GLYCOPYRROLATE 0.8 MG: 0.2 INJECTION, SOLUTION INTRAMUSCULAR; INTRAVENOUS at 13:10:00

## 2022-11-28 RX ADMIN — SODIUM CHLORIDE, SODIUM LACTATE, POTASSIUM CHLORIDE, CALCIUM CHLORIDE: 600; 310; 30; 20 INJECTION, SOLUTION INTRAVENOUS at 12:10:00

## 2022-11-28 RX ADMIN — SODIUM CHLORIDE, SODIUM LACTATE, POTASSIUM CHLORIDE, CALCIUM CHLORIDE: 600; 310; 30; 20 INJECTION, SOLUTION INTRAVENOUS at 11:16:00

## 2022-11-28 RX ADMIN — LIDOCAINE HYDROCHLORIDE 50 MG: 10 INJECTION, SOLUTION EPIDURAL; INFILTRATION; INTRACAUDAL; PERINEURAL at 10:20:00

## 2022-11-28 RX ADMIN — DEXAMETHASONE SODIUM PHOSPHATE 8 MG: 4 MG/ML VIAL (ML) INJECTION at 10:32:00

## 2022-11-28 RX ADMIN — SODIUM CHLORIDE, SODIUM LACTATE, POTASSIUM CHLORIDE, CALCIUM CHLORIDE: 600; 310; 30; 20 INJECTION, SOLUTION INTRAVENOUS at 13:19:00

## 2022-11-28 RX ADMIN — SODIUM CHLORIDE, SODIUM LACTATE, POTASSIUM CHLORIDE, CALCIUM CHLORIDE: 600; 310; 30; 20 INJECTION, SOLUTION INTRAVENOUS at 10:16:00

## 2022-11-28 RX ADMIN — CLINDAMYCIN PHOSPHATE 900 MG: 900 INJECTION INTRAVENOUS at 10:16:00

## 2022-11-28 RX ADMIN — NEOSTIGMINE METHYLSULFATE 5 MG: 1 INJECTION, SOLUTION INTRAVENOUS at 13:10:00

## 2022-11-28 RX ADMIN — SODIUM CHLORIDE, SODIUM LACTATE, POTASSIUM CHLORIDE, CALCIUM CHLORIDE: 600; 310; 30; 20 INJECTION, SOLUTION INTRAVENOUS at 12:46:00

## 2022-11-28 RX ADMIN — ONDANSETRON 4 MG: 2 INJECTION INTRAMUSCULAR; INTRAVENOUS at 13:09:00

## 2022-11-28 RX ADMIN — SODIUM CHLORIDE, SODIUM LACTATE, POTASSIUM CHLORIDE, CALCIUM CHLORIDE: 600; 310; 30; 20 INJECTION, SOLUTION INTRAVENOUS at 13:10:00

## 2022-11-28 RX ADMIN — SODIUM CHLORIDE, SODIUM LACTATE, POTASSIUM CHLORIDE, CALCIUM CHLORIDE: 600; 310; 30; 20 INJECTION, SOLUTION INTRAVENOUS at 10:53:00

## 2022-11-28 RX ADMIN — ROCURONIUM BROMIDE 50 MG: 10 INJECTION, SOLUTION INTRAVENOUS at 10:20:00

## 2022-11-28 RX ADMIN — SODIUM CHLORIDE, SODIUM LACTATE, POTASSIUM CHLORIDE, CALCIUM CHLORIDE: 600; 310; 30; 20 INJECTION, SOLUTION INTRAVENOUS at 10:35:00

## 2022-11-28 NOTE — PLAN OF CARE
Alert & oriented x4. VSS on room air. Due to void. Tolerating general diet. Incisions covered with kerlix and ACE wrap. Right and left OSCAR with bloody drainage. Patient updated on plan of care. Questions and concerns addressed.      Problem: Patient/Family Goals  Goal: Patient/Family Long Term Goal  Description: Patient's Long Term Goal: discharge home    Interventions:  - monitor incisions  - prn pain meds  - prn nausea meds  - ambulation  - See additional Care Plan goals for specific interventions  Outcome: Progressing  Goal: Patient/Family Short Term Goal  Description: Patient's Short Term Goal: pain control    Interventions:   - prn meds  - scheduled toradol  - See additional Care Plan goals for specific interventions  Outcome: Progressing no

## 2022-11-28 NOTE — ANESTHESIA PROCEDURE NOTES
Airway  Date/Time: 11/28/2022 10:21 AM  Urgency: elective    Airway not difficult    General Information and Staff    Patient location during procedure: OR  Anesthesiologist: Marino Garnica MD  Performed: anesthesiologist     Indications and Patient Condition  Indications for airway management: anesthesia  Sedation level: deep  Preoxygenated: yes  Patient position: sniffing  Mask difficulty assessment: 1 - vent by mask    Final Airway Details  Final airway type: endotracheal airway      Successful airway: ETT  Cuffed: yes   Successful intubation technique: direct laryngoscopy  Facilitating devices/methods: intubating stylet  Endotracheal tube insertion site: oral  Blade: Oxana  Blade size: #3  ETT size (mm): 7.0    Cormack-Lehane Classification: grade I - full view of glottis  Placement verified by: chest auscultation and capnometry   Measured from: lips  ETT to lips (cm): 21  Number of attempts at approach: 1

## 2022-11-28 NOTE — ANESTHESIA PROCEDURE NOTES
Regional Block    Date/Time: 11/28/2022 10:22 AM  Performed by: Lemuel Arreola MD  Authorized by: Lemuel Arreola MD       General Information and Staff    Start Time:  11/28/2022 10:22 AM  End Time:  11/28/2022 10:26 AM  Anesthesiologist:  Lemuel Arreola MD  Performed by: Anesthesiologist  Patient Location:  OR    Block Placement: Post Induction  Site Identification: real time ultrasound guided, surface landmarks and image stored and retrievable    Block site/laterality marked before start: site not marked (N/A - B/L)  Reason for Block: at surgeon's request and post-op pain management    Preanesthetic Checklist: 2 patient identifers, IV checked, risks and benefits discussed, monitors and equipment checked, pre-op evaluation, timeout performed, anesthesia consent, sterile technique used, no prohibitive neurological deficits and no local skin infection at insertion site      Procedure Details    Patient Position:  Supine  Prep: ChloraPrep    Monitoring:  Heart rate, continuous pulse ox and blood pressure cuff  Anesthesia block type: Serratus anterior. Laterality:  Bilateral  Injection Technique:  Single-shot    Needle    Needle Type:   Needle Gauge:  21 G  Needle Length:  100 mm  Needle Localization:  Anatomical landmarks and ultrasound guidance  Reason for Ultrasound Use: appropriate spread of the medication was noted in real time and no ultrasound evidence of intravascular and/or intraneural injection            Assessment    Injection Assessment:  Good spread noted, incremental injection, low pressure, negative resistance and negative aspiration for heme  Paresthesia Pain:  None  Heart Rate Change: No    - Patient tolerated block procedure well without evidence of immediate block related complications.      Medications  11/28/2022 10:22 AM      Additional Comments    Left serratus anterior block: 30cc 0.25% Bupivacaine with Epi 1:200,000 + PF Decadron 2mg  Right serratus anterior block: 30cc 0.25% Bupivacaine with Epi 1:200,000 + PF Decadron 2mg

## 2022-11-28 NOTE — OPERATIVE REPORT
[facility]         Patient Name: Scooby Vasquez   MRN: [id number]     Date of Operation:  2022   Site:  [facility]       : 3/21/1980       PREOPERATIVE DIAGNOSES:  Left breast cancer, T2N0, triple negative, BRCA1 positive, status post neoadjuvant chemotherapy     POSTOPERATIVE DIAGNOSES:  Same     PROCEDURE PERFORMED:  Bilateral skin sparing mastectomy with left specimen mammogram, left axillary sentinel lymph node biopsy (deep), injection of isosulfan blue dye     SURGEON: Omid Navarro M.D.      ASSISTANT: NEYMAR Goldstein,  (skilled services required for positioning, prepping, draping, setting up the field, exposing, retracting, suturing, closing, dressing, etc.)     ANESTHESIA:  General     COMPLICATIONS: None     ESTIMATED BLOOD LOSS:  50 mL     SPECIMEN:  Right breast, 3 left axillary sentinel lymph nodes, left breast     IMPLANTS: None     GRAFTS: None      DRAINS:  13 Nepalese round drain in each wound     BLOOD PRODUCTS ADMINISTERED: None     HISTORY: This patient is a 36year old-year-old female who has a history of BRCA1 mutation. Earlier this year, she was diagnosed with left breast cancer, T2N0, triple negative. She underwent neoadjuvant chemotherapy, with a significant response determined both clinically and with serial ultrasound scans. Bilateral mastectomy with left axillary sentinel lymph node biopsy and possible left axillary dissection was recommended. The surgery is being done with curative intent. She underwent consultation with 2 plastic surgeons with regard to breast reconstruction, and defers at this time. The risks, benefits, and alternatives of surgery were discussed with her and her mother. FINDINGS:  3 left axillary sentinel lymph nodes were negative on frozen section analysis     PROCEDURE: The patient was seen in the preoperative holding area with her mother. The surgical plan was reviewed. Both sides were initialed.   The lymphoscintigraphy report and images were reviewed. The preoperative imaging was reviewed. She was taken to the operating room, placed in the supine position, and administered general anesthesia. Her upper extremities were placed on armboards. Serratus blocks were performed by anesthesia. A timeout was performed. 5 mL of isosulfan blue dye was injected in the left periareolar region, and a 5-minute massage was performed. The field area was reprepped and draped. Symmetrical nipple sacrificing skin sparing mastectomy incisions were marked. The right side was approached first.  The incision was made with scalpel. Flaps were raised to the superior, medial, and inferior extents of the breast.  The dissection was carried posterior to the port cavity without entering it. The dissection was carried off the chest wall en bloc with the pectoral fascia, and incorporated the entire tail of the breast from the low axilla. At no point was any suspicious tissue encountered visibly or palpably. The intercostobrachial nerve was spared. The specimen was oriented with a marking stitch on the lateral end of the skin ellipse and forwarded to pathology labeled \"right breast\". The wound was irrigated with water and rendered hemostatic. A 15 Arabic round drain was introduced through the lateral lower flap and positioned beneath the upper flap. The drain was secured. The incision was closed with Insorb staples followed by a Vicryl subcuticular stitch. Attention was turned to the left side. The mastectomy incision was raised with scalpel. The lateral end of the flaps were developed first, gaining access into the deep axillary space through the clavipectoral fascia. With the aid of the gamma probe, a lower node was identified, possibly in the axillary tail of the breast.  It was minimally blue. Ex vivo, it had a count of 4297. It was forwarded to pathology labeled \"left axillary sentinel lymph node #1\".   Scanning led to 2 additional nodes in the central axilla. The first node was markedly blue and had an ex vivo count of 27,548. It was forwarded to pathology labeled \"left axillary sentinel lymph node #2\". The third node was also markedly blue, and had an ex vivo count of 41,796. It was forwarded to pathology labeled \"left axillary sentinel lymph node #3\". None of the nodes was clinically suspicious for metastasis. The background count was 45. Completion inspection for blue nodes, palpable nodes, or blue lymphatics was negative. There were no remaining hotspots. The intercostobrachial nerve was spared. The thoracodorsal nerve was identified in the base of the wound, and noted to be functionally intact with blunt stimulation. Frozen section analysis demonstrated all 3 nodes were negative. The remainder of the mastectomy incision was raised. The flaps were raised superiorly, medially, and inferiorly to the extent of the breast.  The breast was removed from the chest wall en bloc with the pectoral fascia, and incorporated the tail of the breast from the low axilla. There was a small palpable node in the tail of the breast.  There was no suspicious tissue identified palpably or visibly during the course of the dissection. The breast was oriented with a marking stitch on the lateral end of the skin ellipse, and imaged with Faxitron, confirming the presence of the \"X\" clip in the specimen. It was forwarded to pathology labeled \"left breast\". The wound was irrigated with water and rendered hemostatic. A 15 Ukrainian round drain was placed and secured. The wound was closed analogous to the opposite side. The wounds were dressed with skin glue followed by Mastisol and Steri-Strips. The drains were dressed and placed to bulb suction. Fluffs and 6 inch Ace wraps were placed, wrapping around the chest.  The needle, sponge, and instrument counts were reported as correct. The patient was awakened and transported to recovery.   Her mother was notified of the findings and her status.         Tammy Nava MD

## 2022-11-28 NOTE — INTERVAL H&P NOTE
Pre-op Diagnosis: BRCA 1 GENE MUTATION POSITIVE IN FEMALE MALIGNANT NEOPLASM OF UPPER-OUTER QUADRANT OF LEFT BREAST IN FEMALE, ESTROGEN RECEPTOR NEGATIVE    The above referenced H&P was reviewed by Dez Ron MD on 11/28/2022, the patient was examined and no significant changes have occurred in the patient's condition since the H&P was performed. I discussed with the patient and/or legal representative the potential benefits, risks and side effects of this procedure; the likelihood of the patient achieving goals; and potential problems that might occur during recuperation. I discussed reasonable alternatives to the procedure, including risks, benefits and side effects related to the alternatives and risks related to not receiving this procedure. We will proceed with procedure as planned. She is doing well today. She is accompanied by her mother, who also had mastectomy surgery. She has completed neoadjuvant treatment. She had a substantial response to the chemotherapy, possible complete response. She presents for bilateral mastectomy with left axillary sentinel lymph node biopsy, possible axillary dissection. She has seen multiple plastic surgeons in consultation, and ultimately opted for no reconstruction. We had a lengthy discussion about this over the past week, and communicated with the plastic surgeon. Kayla lymphoscintigraphy report and images were reviewed and discussed with her. Both breasts were initialed.

## 2022-11-28 NOTE — ANESTHESIA POSTPROCEDURE EVALUATION
[address] Patient Status:  Outpatient in a Bed   Age/Gender 36year old female MRN [id number]   [facility] SURGERY Attending Nadeen Clemens MD   Hosp Day # 0 PCP Anish Mccurdy MD       Anesthesia Post-op Note    BILATERAL MASTECTOMY WITH LEFT BREAST SENTINEL NODE BIOPSY    Procedure Summary     Date: 11/28/22 Room / Location: [facility] MAIN OR 18 / [facility] MAIN OR    Anesthesia Start: 3965 Anesthesia Stop: 0315    Procedure: BILATERAL MASTECTOMY WITH LEFT BREAST SENTINEL NODE BIOPSY (Left: Breast) Diagnosis: (BRCA 1 GENE MUTATION POSITIVE IN FEMALE MALIGNANT NEOPLASM OF UPPER-OUTER QUADRANT OF LEFT BREAST IN FEMALE, ESTROGEN RECEPTOR NEGATIVE)    Surgeons: Nadeen Clemens MD Anesthesiologist: Isa Beltran MD    Anesthesia Type: general ASA Status: 2          Anesthesia Type: general    Vitals Value Taken Time   /62 11/28/22 1340   Temp 97.0 11/28/22 1340   Pulse 91 11/28/22 1340   Resp 18 11/28/22 1340   SpO2 97% (RA) 11/28/22 1340       Patient Location: PACU    Anesthesia Type: general    Airway Patency: patent    Postop Pain Control: adequate    Mental Status: preanesthetic baseline    Nausea/Vomiting: none    Cardiopulmonary/Hydration status: stable euvolemic    Complications: no apparent anesthesia related complications    Postop vital signs: stable    Dental Exam: Unchanged from Preop    Patient to be discharged from PACU when criteria met.

## 2022-11-29 VITALS
BODY MASS INDEX: 35.49 KG/M2 | DIASTOLIC BLOOD PRESSURE: 62 MMHG | HEART RATE: 57 BPM | SYSTOLIC BLOOD PRESSURE: 107 MMHG | WEIGHT: 220.81 LBS | OXYGEN SATURATION: 91 % | HEIGHT: 66 IN | TEMPERATURE: 98 F | RESPIRATION RATE: 18 BRPM

## 2022-11-29 RX ORDER — TRAMADOL HYDROCHLORIDE 50 MG/1
TABLET ORAL EVERY 4 HOURS PRN
Qty: 20 TABLET | Refills: 0 | Status: SHIPPED | OUTPATIENT
Start: 2022-11-29

## 2022-11-29 NOTE — PLAN OF CARE
NURSING DISCHARGE NOTE    Discharged Home via Wheelchair. Accompanied by Family member  Belongings Taken by patient/family. VSS, tolerating diet, voiding adequately, pain controlled, tolerating ambulating. Writing RN spoke w/ sx and hospitalist, pt cleared for dc. Reviewed drain care w/ pt and mom. Discharge education provided. Reviewed medications and follow up appts. All questions answered and concerns addressed, pt verbalized understanding. Pt dc in stable condition    Upon assessment, A&Ox4. L arm precautions in place. RA. IS encouraged, performing at 1000. Tolerating diet. Active bowel sounds. Passing flatus. Voids adequate amount of urine. C/o mild pain, managed per [facility] and non-pharmacological interventions. Independent after set-up. Denies SOB, CP, lightheadedness, N/V. Tolerating ambulating. Bilateral breast incisions w/ steri strips, kerlex, ace wrap, and sx bra, no bruising noted around incisions, dressing C/D/I. OSCAR x2 w/ ss output, education provided on drain care. PIV SL. POC discussed w/ pt, all questions answered and concerns addressed. Safety precautions in place. Frequent rounds performed.      Problem: Patient/Family Goals  Goal: Patient/Family Long Term Goal  Description: Patient's Long Term Goal: discharge home    Interventions:  - monitor incisions  - prn pain meds  - prn nausea meds  - ambulation  - See additional Care Plan goals for specific interventions  Outcome: Completed  Goal: Patient/Family Short Term Goal  Description: Patient's Short Term Goal: pain control    Interventions:   - prn meds  - scheduled toradol  - See additional Care Plan goals for specific interventions  Outcome: Completed

## 2022-11-29 NOTE — PLAN OF CARE
A&Ox4. VSS. RA. . Denies chest pain and SOB. GI: Abdomen soft, nondistended. Denies passing gas. Pt had emesis x 2-- first was brown after she had chocolate ice cream. Zofran and Reglan given, pages hospitalist about more meds b/c emesis after nausea meds. Hosp ordered one time dose of ativan, pt declined ativan at the time it was ordered. I will offer again if needed. : Voided 600 so far after garcia removed. Pain controlled with her scheduled tramadol, declined tylenol. Up with standby assist.  Drains: L and R OSCAR drain. Draining dark bloody fluid to bulb suction. Incisions: WILY incisions but no known drainage. Diet: Regular diet but taking slow after emesis. IVF running per order (LR TKO at 20)  All appropriate safety measures in place. All questions and concerns addressed. Will continue to monitor.      Problem: Patient/Family Goals  Goal: Patient/Family Long Term Goal  Description: Patient's Long Term Goal: discharge home    Interventions:  - monitor incisions  - prn pain meds  - prn nausea meds  - ambulation  - See additional Care Plan goals for specific interventions  Outcome: Progressing  Goal: Patient/Family Short Term Goal  Description: Patient's Short Term Goal: pain control    Interventions:   - prn meds  - scheduled toradol  - See additional Care Plan goals for specific interventions  Outcome: Progressing

## 2022-11-29 NOTE — DISCHARGE INSTRUCTIONS
Home Care Instructions Following Your Breast Surgery    Bandages (Dressing)  Keep dressings clean and dry  Do not remove the ace wrap  Reinforce the dressing with insertion of additional padding as needed - this is not required - for your comfort only    Drain Care  Proper drain care is an important part of your home care and will help to prevent fluid collection, minimize the risk for infection, and increase the success of your breast reconstruction. Empty your drains twice a day  Record each drain separately and use the drain sheet given to you to record the drainage. Follow the instructions on the drain sheet. Wash your hands before and after emptying your drains  Bring drain sheet with you to your first office visit    Bathing/Showers  Sponge bath daily until you follow up with Dr. Ngozi Mckeon in the office     Pain Medication: Tramadol  Take pain medication as directed   Do not take narcotics, if you do not have pain. Keep stools soft. Take a stool softener (Metamucil, Milk of Magnesia, Colace). Eating fruit will also help to prevent constipation    Antibiotics  No antibiotics     Over-The-Counter Medication  Non-prescription anti-inflammatory medications can be used (Tylenol)       What To Expect  There will be some spotting of the incision lines for the next 1-2 weeks. Your breasts will be swollen and might feel congested (similar to breast feeding) for the next 1-2 weeks  Drink a full glass of water with the medication    Home Medication  Resume your home medications as instructed  Do not resume herbal medications for two weeks    Diet  Resume your normal diet    Activity  No strenuous activity or heavy lifting  You can go up and down the stairs as tolerated  If your work requires strenuous activity, you may not return to work. You cannot return to physical exercise, sports, or gym workouts until you are allowed to participate in strenuous activity.     Driving  Do not drive, if you are taking pain medication.     Questions or Concerns  if you experience severe pain not controlled by pain medication, swelling, numbness, tingling, bleeding, fever, or other concerns contact Dr. Seb Nolasco office   Call the office to schedule a post-op visit

## 2022-12-06 ENCOUNTER — TELEPHONE (OUTPATIENT)
Dept: HEMATOLOGY/ONCOLOGY | Facility: HOSPITAL | Age: 42
End: 2022-12-06

## 2022-12-06 ENCOUNTER — OFFICE VISIT (OUTPATIENT)
Dept: HEMATOLOGY/ONCOLOGY | Facility: HOSPITAL | Age: 42
End: 2022-12-06
Attending: SPECIALIST
Payer: COMMERCIAL

## 2022-12-06 VITALS
WEIGHT: 213.38 LBS | TEMPERATURE: 99 F | HEART RATE: 84 BPM | BODY MASS INDEX: 33.49 KG/M2 | RESPIRATION RATE: 18 BRPM | HEIGHT: 66.93 IN | OXYGEN SATURATION: 95 % | DIASTOLIC BLOOD PRESSURE: 81 MMHG | SYSTOLIC BLOOD PRESSURE: 138 MMHG

## 2022-12-06 DIAGNOSIS — Z17.1 MALIGNANT NEOPLASM OF UPPER-OUTER QUADRANT OF LEFT BREAST IN FEMALE, ESTROGEN RECEPTOR NEGATIVE (HCC): ICD-10-CM

## 2022-12-06 DIAGNOSIS — C50.412 MALIGNANT NEOPLASM OF UPPER-OUTER QUADRANT OF LEFT BREAST IN FEMALE, ESTROGEN RECEPTOR NEGATIVE (HCC): ICD-10-CM

## 2022-12-06 DIAGNOSIS — Z51.12 ENCOUNTER FOR ANTINEOPLASTIC IMMUNOTHERAPY: ICD-10-CM

## 2022-12-06 DIAGNOSIS — C50.919 TRIPLE NEGATIVE BREAST CANCER (HCC): Primary | ICD-10-CM

## 2022-12-06 DIAGNOSIS — K21.9 GERD WITHOUT ESOPHAGITIS: Primary | ICD-10-CM

## 2022-12-06 DIAGNOSIS — Z15.01 BRCA GENE POSITIVE: ICD-10-CM

## 2022-12-06 DIAGNOSIS — C50.919 TRIPLE NEGATIVE BREAST CANCER (HCC): ICD-10-CM

## 2022-12-06 DIAGNOSIS — Z15.09 BRCA GENE POSITIVE: ICD-10-CM

## 2022-12-06 LAB
ALBUMIN SERPL-MCNC: 3.2 G/DL (ref 3.4–5)
ALBUMIN/GLOB SERPL: 0.9 {RATIO} (ref 1–2)
ALP LIVER SERPL-CCNC: 139 U/L
ALT SERPL-CCNC: 138 U/L
ANION GAP SERPL CALC-SCNC: 3 MMOL/L (ref 0–18)
AST SERPL-CCNC: 62 U/L (ref 15–37)
BASOPHILS # BLD AUTO: 0.04 X10(3) UL (ref 0–0.2)
BASOPHILS NFR BLD AUTO: 0.7 %
BILIRUB SERPL-MCNC: 0.4 MG/DL (ref 0.1–2)
BUN BLD-MCNC: 11 MG/DL (ref 7–18)
CALCIUM BLD-MCNC: 9 MG/DL (ref 8.5–10.1)
CHLORIDE SERPL-SCNC: 108 MMOL/L (ref 98–112)
CO2 SERPL-SCNC: 29 MMOL/L (ref 21–32)
CREAT BLD-MCNC: 0.9 MG/DL
EOSINOPHIL # BLD AUTO: 1.41 X10(3) UL (ref 0–0.7)
EOSINOPHIL NFR BLD AUTO: 25.1 %
ERYTHROCYTE [DISTWIDTH] IN BLOOD BY AUTOMATED COUNT: 14.6 %
GFR SERPLBLD BASED ON 1.73 SQ M-ARVRAT: 81 ML/MIN/1.73M2 (ref 60–?)
GLOBULIN PLAS-MCNC: 3.6 G/DL (ref 2.8–4.4)
GLUCOSE BLD-MCNC: 117 MG/DL (ref 70–99)
HCT VFR BLD AUTO: 34.6 %
HGB BLD-MCNC: 11.3 G/DL
IMM GRANULOCYTES # BLD AUTO: 0.01 X10(3) UL (ref 0–1)
IMM GRANULOCYTES NFR BLD: 0.2 %
LYMPHOCYTES # BLD AUTO: 0.77 X10(3) UL (ref 1–4)
LYMPHOCYTES NFR BLD AUTO: 13.7 %
MCH RBC QN AUTO: 32.8 PG (ref 26–34)
MCHC RBC AUTO-ENTMCNC: 32.7 G/DL (ref 31–37)
MCV RBC AUTO: 100.6 FL
MONOCYTES # BLD AUTO: 0.45 X10(3) UL (ref 0.1–1)
MONOCYTES NFR BLD AUTO: 8 %
NEUTROPHILS # BLD AUTO: 2.94 X10 (3) UL (ref 1.5–7.7)
NEUTROPHILS # BLD AUTO: 2.94 X10(3) UL (ref 1.5–7.7)
NEUTROPHILS NFR BLD AUTO: 52.3 %
OSMOLALITY SERPL CALC.SUM OF ELEC: 290 MOSM/KG (ref 275–295)
PLATELET # BLD AUTO: 187 10(3)UL (ref 150–450)
POTASSIUM SERPL-SCNC: 3.8 MMOL/L (ref 3.5–5.1)
PROT SERPL-MCNC: 6.8 G/DL (ref 6.4–8.2)
RBC # BLD AUTO: 3.44 X10(6)UL
SODIUM SERPL-SCNC: 140 MMOL/L (ref 136–145)
WBC # BLD AUTO: 5.6 X10(3) UL (ref 4–11)

## 2022-12-06 PROCEDURE — 85025 COMPLETE CBC W/AUTO DIFF WBC: CPT

## 2022-12-06 PROCEDURE — 96413 CHEMO IV INFUSION 1 HR: CPT

## 2022-12-06 PROCEDURE — 99215 OFFICE O/P EST HI 40 MIN: CPT | Performed by: SPECIALIST

## 2022-12-06 PROCEDURE — 80053 COMPREHEN METABOLIC PANEL: CPT

## 2022-12-06 RX ORDER — ACETAMINOPHEN 500 MG
1000 TABLET ORAL EVERY 6 HOURS PRN
COMMUNITY

## 2022-12-06 NOTE — TELEPHONE ENCOUNTER
Dick Sommer  has numbness and swelling extended over the area of the [place]. Denies nausea or vomiting. Also has treatment today. Doesn't know what she should do.   Thanks [employer]

## 2022-12-06 NOTE — TELEPHONE ENCOUNTER
Called patient she states she is noting some swelling and numbness with mastectomy site extending over are of the port. Was not sure if to come in for tx and MD visit today or not. Instructed for patient to keep MD apt for evaluation of area and decision on [address]. She verbalizes understanding.

## 2022-12-06 NOTE — PROGRESS NOTES
Patient is here today for follow up with Sia Hou for Triple negative - Malignant neoplasm left breast. C10D1 Pembrolizumab Aleksander Dupont). Patient denies pain. Denies adverse side effects for immunotherapy. Medication list, medical history and toxicities were reviewed and updated. Education Record    Learner:  Patient and mother    Disease / Diagnosis: Triple negative left breast.     Barriers / Limitations:  None   Comments:    Method:  Brief focused, Discussion, Printed material and Reinforcement   Comments:    General Topics:  Medication, Pain, Procedure and Plan of care reviewed   Comments:    Outcome:  Shows understanding   Comments:    Gilbert Araujo MD visit - treating RN notified ok for treatment. AVS provided and follow up reviewed. Patient instructed to call as needed.

## 2022-12-06 NOTE — PROGRESS NOTES
Pt here for C10,D1 keytruda. Arrives Ambulating independently, accompanied by Self           Pregnancy screening: Not applicable    Modifications in dose or schedule: No --  MD aware of liver enzymes being elevated. Just had surgery, can be elevated from anesthesia. Patient aware and verbalized understanding. Told patient MD would like her to get labs drawn again this coming Friday to make sure enzymes are coming down. Will be getting them drawn at an [facility] outpatient lab in [place]. Drugs/infusions dual verified for appearance and physical integrity.  IV pump settings were dual verified: yes     Frequency of blood return and site check throughout administration: Prior to administration and At completion of therapy   Discharged to Home, Ambulating independently, accompanied by:Self    Outpatient Oncology Care Plan  Problem list:  fatigue  knowledge deficit  Problems related to:  side effect of treatment  Interventions:  promoted rest  provided general teaching  Expected outcomes:  safe in environment  symptoms relieved/minimized  understands plan of care  Progress towards outcome:  making progress    Education Record    Learner:  Patient  Barriers / Limitations:  None  Method:  Brief focused  Outcome:  Shows understanding  Comments:

## 2022-12-09 ENCOUNTER — LABORATORY ENCOUNTER (OUTPATIENT)
Dept: LAB | Age: 42
End: 2022-12-09
Attending: SPECIALIST
Payer: COMMERCIAL

## 2022-12-09 DIAGNOSIS — Z17.1 MALIGNANT NEOPLASM OF UPPER-OUTER QUADRANT OF LEFT BREAST IN FEMALE, ESTROGEN RECEPTOR NEGATIVE (HCC): ICD-10-CM

## 2022-12-09 DIAGNOSIS — C50.919 TRIPLE NEGATIVE BREAST CANCER (HCC): ICD-10-CM

## 2022-12-09 DIAGNOSIS — Z15.01 BRCA GENE POSITIVE: ICD-10-CM

## 2022-12-09 DIAGNOSIS — C50.412 MALIGNANT NEOPLASM OF UPPER-OUTER QUADRANT OF LEFT BREAST IN FEMALE, ESTROGEN RECEPTOR NEGATIVE (HCC): ICD-10-CM

## 2022-12-09 DIAGNOSIS — Z15.09 BRCA GENE POSITIVE: ICD-10-CM

## 2022-12-09 DIAGNOSIS — K21.9 GERD WITHOUT ESOPHAGITIS: ICD-10-CM

## 2022-12-09 DIAGNOSIS — Z51.12 ENCOUNTER FOR ANTINEOPLASTIC IMMUNOTHERAPY: ICD-10-CM

## 2022-12-09 LAB
ALBUMIN SERPL-MCNC: 3.2 G/DL (ref 3.4–5)
ALBUMIN/GLOB SERPL: 1 {RATIO} (ref 1–2)
ALP LIVER SERPL-CCNC: 116 U/L
ALT SERPL-CCNC: 83 U/L
ANION GAP SERPL CALC-SCNC: 3 MMOL/L (ref 0–18)
AST SERPL-CCNC: 23 U/L (ref 15–37)
BILIRUB SERPL-MCNC: 0.4 MG/DL (ref 0.1–2)
BUN BLD-MCNC: 18 MG/DL (ref 7–18)
CALCIUM BLD-MCNC: 9.5 MG/DL (ref 8.5–10.1)
CHLORIDE SERPL-SCNC: 110 MMOL/L (ref 98–112)
CO2 SERPL-SCNC: 29 MMOL/L (ref 21–32)
CREAT BLD-MCNC: 1.01 MG/DL
FASTING STATUS PATIENT QL REPORTED: NO
GFR SERPLBLD BASED ON 1.73 SQ M-ARVRAT: 71 ML/MIN/1.73M2 (ref 60–?)
GLOBULIN PLAS-MCNC: 3.3 G/DL (ref 2.8–4.4)
GLUCOSE BLD-MCNC: 104 MG/DL (ref 70–99)
OSMOLALITY SERPL CALC.SUM OF ELEC: 296 MOSM/KG (ref 275–295)
POTASSIUM SERPL-SCNC: 4.1 MMOL/L (ref 3.5–5.1)
PROT SERPL-MCNC: 6.5 G/DL (ref 6.4–8.2)
SODIUM SERPL-SCNC: 142 MMOL/L (ref 136–145)

## 2022-12-09 PROCEDURE — 80053 COMPREHEN METABOLIC PANEL: CPT

## 2022-12-09 PROCEDURE — 36415 COLL VENOUS BLD VENIPUNCTURE: CPT

## 2022-12-15 ENCOUNTER — TELEPHONE (OUTPATIENT)
Dept: HEMATOLOGY/ONCOLOGY | Facility: HOSPITAL | Age: 42
End: 2022-12-15

## 2022-12-15 NOTE — TELEPHONE ENCOUNTER
Called patient with recommendation per Dr Maryan Parr. To follow directions from PCP and if diarrhea continues to come in for ACV with APN on Monday. She verbalizes understanding.

## 2022-12-15 NOTE — TELEPHONE ENCOUNTER
Patient is calling because she is currently on her way to see her Primary Care and she wanted to update that she has been having diarrhea off and on for the past week, she stated yesterday is was a bit worse and more frequent.  Call back number is  [phone]

## 2022-12-15 NOTE — TELEPHONE ENCOUNTER
12/6/22 - C10D1 - Keytruda    Diarrhea/runny nose/H/a/Heartburn    Diarrhea - Grade 2 - patient states has had intermittent loose stools since tx maybe every other day but yesterday had more diarrhea=6 in last 24 hours, not using Imodium, color yellow, no blood noted. Also having some heart burn, has had gastric reflex ongoing during tx. She held her Tramadol and Motrin she was taking post surgery. Denies fevers, has runny nose and H/a (son also had stuffyrunny nose yesterday and she tested him for Covid which was negative. She did not have another test for herself). Denies sob, no cough, sore throat or taste change. Appetite off but is eating. . denies lightheadedness or weakness. No urinary complaints. Just arriving at PCP office now and will call when done. Please advise.

## 2022-12-27 ENCOUNTER — OFFICE VISIT (OUTPATIENT)
Dept: HEMATOLOGY/ONCOLOGY | Facility: HOSPITAL | Age: 42
End: 2022-12-27
Attending: SPECIALIST
Payer: COMMERCIAL

## 2022-12-27 ENCOUNTER — TELEPHONE (OUTPATIENT)
Dept: HEMATOLOGY/ONCOLOGY | Facility: HOSPITAL | Age: 42
End: 2022-12-27

## 2022-12-27 DIAGNOSIS — Z17.1 MALIGNANT NEOPLASM OF UPPER-OUTER QUADRANT OF LEFT BREAST IN FEMALE, ESTROGEN RECEPTOR NEGATIVE (HCC): ICD-10-CM

## 2022-12-27 DIAGNOSIS — C50.412 MALIGNANT NEOPLASM OF UPPER-OUTER QUADRANT OF LEFT BREAST IN FEMALE, ESTROGEN RECEPTOR NEGATIVE (HCC): ICD-10-CM

## 2022-12-27 DIAGNOSIS — C50.919 TRIPLE NEGATIVE BREAST CANCER (HCC): Primary | ICD-10-CM

## 2022-12-27 DIAGNOSIS — Z15.01 BRCA GENE POSITIVE: ICD-10-CM

## 2022-12-27 DIAGNOSIS — Z51.12 ENCOUNTER FOR ANTINEOPLASTIC IMMUNOTHERAPY: ICD-10-CM

## 2022-12-27 DIAGNOSIS — Z15.09 BRCA GENE POSITIVE: ICD-10-CM

## 2022-12-27 NOTE — TELEPHONE ENCOUNTER
Attempted to call pt about missed apt for labs, APN and treatment; voicemail box was full and was unable to leave message.  Pt was due to come in at 1 PM for labs and see APN at 130 PM.

## 2023-02-16 ENCOUNTER — NURSE ONLY (OUTPATIENT)
Dept: HEMATOLOGY/ONCOLOGY | Facility: HOSPITAL | Age: 43
End: 2023-02-16
Attending: SPECIALIST
Payer: COMMERCIAL

## 2023-02-16 VITALS
TEMPERATURE: 98 F | HEIGHT: 66.93 IN | RESPIRATION RATE: 18 BRPM | BODY MASS INDEX: 33.12 KG/M2 | OXYGEN SATURATION: 98 % | DIASTOLIC BLOOD PRESSURE: 84 MMHG | HEART RATE: 100 BPM | WEIGHT: 211 LBS | SYSTOLIC BLOOD PRESSURE: 138 MMHG

## 2023-02-16 DIAGNOSIS — C50.919 TRIPLE NEGATIVE BREAST CANCER (HCC): ICD-10-CM

## 2023-02-16 DIAGNOSIS — R94.6 ABNORMAL THYROID FUNCTION TEST: ICD-10-CM

## 2023-02-16 DIAGNOSIS — Z15.09 BRCA GENE POSITIVE: ICD-10-CM

## 2023-02-16 DIAGNOSIS — C50.919 TRIPLE NEGATIVE BREAST CANCER (HCC): Primary | ICD-10-CM

## 2023-02-16 DIAGNOSIS — Z17.1 MALIGNANT NEOPLASM OF UPPER-OUTER QUADRANT OF LEFT BREAST IN FEMALE, ESTROGEN RECEPTOR NEGATIVE (HCC): ICD-10-CM

## 2023-02-16 DIAGNOSIS — C50.412 MALIGNANT NEOPLASM OF UPPER-OUTER QUADRANT OF LEFT BREAST IN FEMALE, ESTROGEN RECEPTOR NEGATIVE (HCC): ICD-10-CM

## 2023-02-16 DIAGNOSIS — Z15.01 BRCA GENE POSITIVE: ICD-10-CM

## 2023-02-16 LAB
ALBUMIN SERPL-MCNC: 4 G/DL (ref 3.4–5)
ALBUMIN/GLOB SERPL: 1.1 {RATIO} (ref 1–2)
ALP LIVER SERPL-CCNC: 111 U/L
ALT SERPL-CCNC: 35 U/L
ANION GAP SERPL CALC-SCNC: 6 MMOL/L (ref 0–18)
AST SERPL-CCNC: 17 U/L (ref 15–37)
BASOPHILS # BLD AUTO: 0.02 X10(3) UL (ref 0–0.2)
BASOPHILS NFR BLD AUTO: 0.3 %
BILIRUB SERPL-MCNC: 0.3 MG/DL (ref 0.1–2)
BUN BLD-MCNC: 17 MG/DL (ref 7–18)
CALCIUM BLD-MCNC: 9.4 MG/DL (ref 8.5–10.1)
CHLORIDE SERPL-SCNC: 108 MMOL/L (ref 98–112)
CO2 SERPL-SCNC: 26 MMOL/L (ref 21–32)
CREAT BLD-MCNC: 0.92 MG/DL
EOSINOPHIL # BLD AUTO: 0.16 X10(3) UL (ref 0–0.7)
EOSINOPHIL NFR BLD AUTO: 2.4 %
ERYTHROCYTE [DISTWIDTH] IN BLOOD BY AUTOMATED COUNT: 12.1 %
GFR SERPLBLD BASED ON 1.73 SQ M-ARVRAT: 79 ML/MIN/1.73M2 (ref 60–?)
GLOBULIN PLAS-MCNC: 3.7 G/DL (ref 2.8–4.4)
GLUCOSE BLD-MCNC: 111 MG/DL (ref 70–99)
HCT VFR BLD AUTO: 42.1 %
HGB BLD-MCNC: 14.3 G/DL
IMM GRANULOCYTES # BLD AUTO: 0.02 X10(3) UL (ref 0–1)
IMM GRANULOCYTES NFR BLD: 0.3 %
LYMPHOCYTES # BLD AUTO: 1.11 X10(3) UL (ref 1–4)
LYMPHOCYTES NFR BLD AUTO: 16.9 %
MCH RBC QN AUTO: 32 PG (ref 26–34)
MCHC RBC AUTO-ENTMCNC: 34 G/DL (ref 31–37)
MCV RBC AUTO: 94.2 FL
MONOCYTES # BLD AUTO: 0.5 X10(3) UL (ref 0.1–1)
MONOCYTES NFR BLD AUTO: 7.6 %
NEUTROPHILS # BLD AUTO: 4.74 X10 (3) UL (ref 1.5–7.7)
NEUTROPHILS # BLD AUTO: 4.74 X10(3) UL (ref 1.5–7.7)
NEUTROPHILS NFR BLD AUTO: 72.5 %
OSMOLALITY SERPL CALC.SUM OF ELEC: 292 MOSM/KG (ref 275–295)
PLATELET # BLD AUTO: 203 10(3)UL (ref 150–450)
POTASSIUM SERPL-SCNC: 3.9 MMOL/L (ref 3.5–5.1)
PROT SERPL-MCNC: 7.7 G/DL (ref 6.4–8.2)
RBC # BLD AUTO: 4.47 X10(6)UL
SODIUM SERPL-SCNC: 140 MMOL/L (ref 136–145)
T4 FREE SERPL-MCNC: 2.2 NG/DL (ref 0.8–1.7)
TSI SER-ACNC: 1.01 MIU/ML (ref 0.36–3.74)
WBC # BLD AUTO: 6.6 X10(3) UL (ref 4–11)

## 2023-02-16 PROCEDURE — 84439 ASSAY OF FREE THYROXINE: CPT

## 2023-02-16 PROCEDURE — 84443 ASSAY THYROID STIM HORMONE: CPT

## 2023-02-16 PROCEDURE — 85025 COMPLETE CBC W/AUTO DIFF WBC: CPT

## 2023-02-16 PROCEDURE — 36415 COLL VENOUS BLD VENIPUNCTURE: CPT

## 2023-02-16 PROCEDURE — 80053 COMPREHEN METABOLIC PANEL: CPT

## 2023-02-16 PROCEDURE — 99214 OFFICE O/P EST MOD 30 MIN: CPT | Performed by: SPECIALIST

## 2023-02-16 NOTE — PROGRESS NOTES
Pt here for Lab Port draw. Pt declined using her port for blood, as it was difficult to get blood from it previously. Pt mentions that she intends to have it removed soon. MA marquis patient without incident and patient left treatment area in stable condition.

## 2023-02-16 NOTE — PROGRESS NOTES
Patient is here today for follow up with Lang Bright for Triple Negative Breast Cancer - last visit was 12/6/2022. Patient denies pain today. Stated missed her last appointment on 12/27/22 because she tested positive for COVID. Patient stated than was having digestive issues reflux, severe stomach pain and diarrhea. Patient was than treatment by her PCP office with Pantoprazole and Pepcid. Medication list,medical history and toxicities were reviewed and updated. Education Record    Learner:  Patient      Disease / Diagnosis: triple negative breast cancer    Barriers / Limitations:  None   Comments:    Method:  Brief focused, Discussion, Printed material and Reinforcement   Comments:    General Topics:  Medication, Pain, Procedure and Plan of care reviewed   Comments:    Outcome:  Shows understanding   Comments:    Corrin Curling MD visit. Patient to have [facility] lab and port flush today. Treating RN notified. AVS provided and follow up reviewed. Patient instructed to call as needed.

## 2023-11-01 ENCOUNTER — OFFICE VISIT (OUTPATIENT)
Dept: HEMATOLOGY/ONCOLOGY | Age: 43
End: 2023-11-01
Attending: SPECIALIST
Payer: COMMERCIAL

## 2023-11-01 VITALS
BODY MASS INDEX: 34.25 KG/M2 | DIASTOLIC BLOOD PRESSURE: 86 MMHG | HEIGHT: 66.93 IN | HEART RATE: 99 BPM | WEIGHT: 218.19 LBS | TEMPERATURE: 99 F | SYSTOLIC BLOOD PRESSURE: 137 MMHG | OXYGEN SATURATION: 98 % | RESPIRATION RATE: 16 BRPM

## 2023-11-01 DIAGNOSIS — Z17.1 MALIGNANT NEOPLASM OF UPPER-OUTER QUADRANT OF LEFT BREAST IN FEMALE, ESTROGEN RECEPTOR NEGATIVE: ICD-10-CM

## 2023-11-01 DIAGNOSIS — C50.919 TRIPLE NEGATIVE BREAST CANCER (HCC): Primary | ICD-10-CM

## 2023-11-01 DIAGNOSIS — Z15.01 BRCA GENE POSITIVE: ICD-10-CM

## 2023-11-01 DIAGNOSIS — Z15.09 BRCA GENE POSITIVE: ICD-10-CM

## 2023-11-01 DIAGNOSIS — C50.412 MALIGNANT NEOPLASM OF UPPER-OUTER QUADRANT OF LEFT BREAST IN FEMALE, ESTROGEN RECEPTOR NEGATIVE: ICD-10-CM

## 2023-11-01 LAB
ALBUMIN SERPL-MCNC: 3.3 G/DL (ref 3.4–5)
ALBUMIN/GLOB SERPL: 0.7 {RATIO} (ref 1–2)
ALP LIVER SERPL-CCNC: 122 U/L
ALT SERPL-CCNC: 46 U/L
ANION GAP SERPL CALC-SCNC: 4 MMOL/L (ref 0–18)
AST SERPL-CCNC: 16 U/L (ref 15–37)
BASOPHILS # BLD AUTO: 0.04 X10(3) UL (ref 0–0.2)
BASOPHILS NFR BLD AUTO: 0.5 %
BILIRUB SERPL-MCNC: 0.4 MG/DL (ref 0.1–2)
BUN BLD-MCNC: 13 MG/DL (ref 9–23)
CALCIUM BLD-MCNC: 8.9 MG/DL (ref 8.5–10.1)
CHLORIDE SERPL-SCNC: 105 MMOL/L (ref 98–112)
CO2 SERPL-SCNC: 28 MMOL/L (ref 21–32)
CREAT BLD-MCNC: 0.92 MG/DL
EGFRCR SERPLBLD CKD-EPI 2021: 79 ML/MIN/1.73M2 (ref 60–?)
EOSINOPHIL # BLD AUTO: 0.17 X10(3) UL (ref 0–0.7)
EOSINOPHIL NFR BLD AUTO: 2 %
ERYTHROCYTE [DISTWIDTH] IN BLOOD BY AUTOMATED COUNT: 11.9 %
GLOBULIN PLAS-MCNC: 4.6 G/DL (ref 2.8–4.4)
GLUCOSE BLD-MCNC: 99 MG/DL (ref 70–99)
HCT VFR BLD AUTO: 39 %
HGB BLD-MCNC: 13.4 G/DL
IMM GRANULOCYTES # BLD AUTO: 0.03 X10(3) UL (ref 0–1)
IMM GRANULOCYTES NFR BLD: 0.4 %
LYMPHOCYTES # BLD AUTO: 1.32 X10(3) UL (ref 1–4)
LYMPHOCYTES NFR BLD AUTO: 15.5 %
MCH RBC QN AUTO: 30.5 PG (ref 26–34)
MCHC RBC AUTO-ENTMCNC: 34.4 G/DL (ref 31–37)
MCV RBC AUTO: 88.8 FL
MONOCYTES # BLD AUTO: 0.76 X10(3) UL (ref 0.1–1)
MONOCYTES NFR BLD AUTO: 9 %
NEUTROPHILS # BLD AUTO: 6.17 X10 (3) UL (ref 1.5–7.7)
NEUTROPHILS # BLD AUTO: 6.17 X10(3) UL (ref 1.5–7.7)
NEUTROPHILS NFR BLD AUTO: 72.6 %
OSMOLALITY SERPL CALC.SUM OF ELEC: 284 MOSM/KG (ref 275–295)
PLATELET # BLD AUTO: 251 10(3)UL (ref 150–450)
POTASSIUM SERPL-SCNC: 3.7 MMOL/L (ref 3.5–5.1)
PROT SERPL-MCNC: 7.9 G/DL (ref 6.4–8.2)
RBC # BLD AUTO: 4.39 X10(6)UL
SODIUM SERPL-SCNC: 137 MMOL/L (ref 136–145)
WBC # BLD AUTO: 8.5 X10(3) UL (ref 4–11)

## 2023-11-01 PROCEDURE — 99214 OFFICE O/P EST MOD 30 MIN: CPT | Performed by: SPECIALIST

## 2024-05-01 ENCOUNTER — APPOINTMENT (OUTPATIENT)
Dept: HEMATOLOGY/ONCOLOGY | Age: 44
End: 2024-05-01
Attending: SPECIALIST
Payer: COMMERCIAL

## 2024-05-09 ENCOUNTER — OFFICE VISIT (OUTPATIENT)
Dept: HEMATOLOGY/ONCOLOGY | Facility: HOSPITAL | Age: 44
End: 2024-05-09
Attending: SPECIALIST
Payer: COMMERCIAL

## 2024-05-09 ENCOUNTER — TELEPHONE (OUTPATIENT)
Dept: HEMATOLOGY/ONCOLOGY | Facility: HOSPITAL | Age: 44
End: 2024-05-09

## 2024-05-09 ENCOUNTER — HOSPITAL ENCOUNTER (OUTPATIENT)
Dept: MAMMOGRAPHY | Facility: HOSPITAL | Age: 44
Discharge: HOME | End: 2024-05-09
Attending: SPECIALIST
Payer: COMMERCIAL

## 2024-05-09 VITALS
RESPIRATION RATE: 18 BRPM | HEART RATE: 77 BPM | DIASTOLIC BLOOD PRESSURE: 80 MMHG | TEMPERATURE: 99 F | SYSTOLIC BLOOD PRESSURE: 142 MMHG | OXYGEN SATURATION: 96 % | WEIGHT: 220.38 LBS | BODY MASS INDEX: 35 KG/M2

## 2024-05-09 DIAGNOSIS — Z15.09 BRCA GENE POSITIVE: ICD-10-CM

## 2024-05-09 DIAGNOSIS — C50.412 MALIGNANT NEOPLASM OF UPPER-OUTER QUADRANT OF LEFT BREAST IN FEMALE, ESTROGEN RECEPTOR NEGATIVE (HCC): ICD-10-CM

## 2024-05-09 DIAGNOSIS — Z15.01 BRCA GENE POSITIVE: ICD-10-CM

## 2024-05-09 DIAGNOSIS — C50.919 TRIPLE NEGATIVE BREAST CANCER (HCC): ICD-10-CM

## 2024-05-09 DIAGNOSIS — C50.412 MALIGNANT NEOPLASM OF UPPER-OUTER QUADRANT OF LEFT BREAST IN FEMALE, ESTROGEN RECEPTOR NEGATIVE (HCC): Primary | ICD-10-CM

## 2024-05-09 DIAGNOSIS — Z17.1 MALIGNANT NEOPLASM OF UPPER-OUTER QUADRANT OF LEFT BREAST IN FEMALE, ESTROGEN RECEPTOR NEGATIVE (HCC): Primary | ICD-10-CM

## 2024-05-09 DIAGNOSIS — Z80.3 FAMILY HISTORY OF MALIGNANT NEOPLASM OF BREAST: Primary | ICD-10-CM

## 2024-05-09 DIAGNOSIS — Z17.1 MALIGNANT NEOPLASM OF UPPER-OUTER QUADRANT OF LEFT BREAST IN FEMALE, ESTROGEN RECEPTOR NEGATIVE (HCC): ICD-10-CM

## 2024-05-09 PROCEDURE — 99213 OFFICE O/P EST LOW 20 MIN: CPT | Performed by: SPECIALIST

## 2024-05-09 PROCEDURE — 76882 US LMTD JT/FCL EVL NVASC XTR: CPT | Performed by: SPECIALIST

## 2024-05-09 NOTE — TELEPHONE ENCOUNTER
Called patient to offer her an appointment today at the [facility] at 1300 for the right axilla ultrasound that Dr. Augustine wanted her to have completed after meeting with her during Dr. Augustine's clinic at the [facility]. She thanked me for the assistance. Pt was provided with the breast nurse navigators contact information and was encouraged to phone with any other questions or concerns.

## 2024-05-09 NOTE — PROGRESS NOTES
[facility] Progress Note      Patient Name: Perla Oliveira   YOB: 1980  Medical Record Number: [id number]  Attending Physician: Everette Augustine M.D.     The 21st Century Cures Act makes medical notes like these available to patients in the interest of transparency. Please be advised this is a medical document. Medical documents are intended to carry relevant information, facts as evident, and the clinical opinion of the practitioner. The medical note is intended as peer to peer communication and may appear blunt or direct. It is written in medical language and may contain abbreviations or verbiage that are unfamiliar.     Date of Visit: 5/9/2024     Chief Complaint  Invasive ductal carcinoma, left breast - follow up.    Oncologic History  Perla Oliveira is a 44 year old female known to be a carrier of a deleterious BRCA1 mutation who on 03/22/2022 underwent bilateral mammography and ultrasound for a self palpated mass in the left breast which showed a 2.2 cm irregular hypoechoic mass in the upper outer quadrant; no axillary adenopathy was noted. Ultrasound guided biopsy of the left breast was performed on 03/23/2022 and showed grade 3 invasive ductal carcinoma. Immunohistochemical studies showed the following: estrogen receptor 0% positive, progesterone receptor 0% positive, Her2 1+, and Ki67 80%. CT chest, abdomen, pelvis w contrast on 04/12/2022 showed the left breast mass; irregular sclerotic lesions within the manubrium, bilateral iliac bones, and punctate foci within the thoracic spine and pelvic bones; an indeterminate right upper lobe nodule, and a 3 cm ovoid mass arising from the uterus likely representing a subserosal fibroid. Bone scan on 04/12/2022 showed mild uptake within the manubrium and mild uptake within the xiphoid process. Of note, patient had a CT chest w contrast on 03/14/2016, the report of which describes an apparent bone island in the sternum. She was  clinically staged as T2N0M0.     On 04/19/2022 patient began neoadjuvant therapy with carboplatin, paclitaxel, and pembrolizumab followed by doxorubicin, cyclophosphamide, and pembrolizumab starting on 07/19/2022. Patient states that beginning on day 1 of first AC + pembrolizumab, she felt \"poisoned.\" She is unable to characterize what she felt more than that. After that, for the following 2 weeks, she felt generally weak and was not eating much. She also developed profound neutropenia, though without fever, that frightened her. Patient was due for cycle 2 of doxorubicin, cyclophosphamide, and pembrolizumab on 08/09/2022 but cancelled the appointment.     She next presented to me for consultation on 09/07/2022. On 09/13/2022 she began cycle 2 with doxorubicin, cyclophosphamide, pembrolizumab.     On 11/28/2022 she underwent bilateral mastectomy and left sentinel lymph node biopsy. Pathology showed no malignancy in the right breast. In the left breast, there was no residual invasive carcinoma, there was two tiny foci of DCIS, and 3 lymph nodes negative for metastatic disease (0/3).     Following surgery on 12/06/2022 she received pembrolizumab. She was due for therapy on 12/27/2023 but failed to present for her appointment. Patient states that she sent a message to our nurse practitioner informing her that she had been diagnosed with COVID and wouldn't be coming in. However, we have no such record of that message. Our MA called the patient and left a voicemail which the patient states she never received. Patient reports that 1 week after the diagnosis of COVID she developed severe diarrhea and abdominal pain for which she saw her primary care physician who treated her with symptomatic therapies. Patient never contacted us about her symptoms or anything else until she scheduled an appointment with me on 02/16/2023. Patient states that she believes that her symptoms were related to pembrolizumab. She states that since  discontinuing pembrolizumab, the reflux she developed during chemotherapy resolved.     Although completion of post-operative pembrolizumab was recommended, patient decided against further systemic therapy.     History of Present Illness  Patient returns for follow up. She denies any self palpated masses, new or progressive respiratory complaints, constipation or abdominal bloating or pain.     Performance Status   Karnofsky 100% - Normal, no complaints.    Past Medical History (historical data, reviewed by physician)  Invasive ductal carcinoma, left breast (as above); migraine headaches.     Past Surgical History (historical data, reviewed by physician)  Prophylactic bilateral salpingo-oophorectomy.     Family History (historical data, reviewed by physician)  Mother with breast cancer age 32 and 36 and is BRCA1 mutation positive; sister is BRCA1 mutation positive. Maternal grandfather with prostate cancer. Father with colorectal cancer. No family history of pancreatic cancer.     Social History (historical data, reviewed by physician)  Denies tobacco use.      Current Medications  No outpatient medications have been marked as taking for the 5/9/24 encounter (Office Visit) with Everette Augustine MD.     Allergies   Ms. Oliveira is allergic to sesame oil, gadolinium, radiology contrast iodinated dyes, and penicillins.     Vital Signs   /80 (BP Location: Left arm, Patient Position: Sitting, Cuff Size: large)   Pulse 77   Temp 98.7 °F (37.1 °C) (Temporal)   Resp 18   Wt 100 kg (220 lb 6.4 oz)   LMP 02/28/2015   SpO2 96%   BMI 34.59 kg/m²      Physical Examination   Constitutional      Well developed, well nourished. Appears close to chronological age. No apparent distress.   Head   Normocephalic and atraumatic.  Eyes   Conjunctiva clear; sclera anicteric.  ENMT                 External nose normal; external ears normal.  Neck   Supple; no masses.   Lymphatics  No cervical, supraclavicular. ?right  axillary lymph node vs scar.   Chest   Bilateral mastectomy without reconstruction; keloid formation bilaterally.   Respiratory          Normal effort; no respiratory distress; clear to auscultation bilaterally.  Cardiovascular  Regular rate and rhythm.   Abdomen  Soft; not tender; no masses; no hepatosplenomegaly.   Extremities  No lower extremity edema.   Neurologic           Motor and sensory grossly intact.  Psychiatric          Mood and affect appropriate.    Laboratory   No results found for this or any previous visit (from the past 48 hour(s)).    Radiology  [facility]  Department of Radiology  [address] [place] [address] [address]  [place] [address] [phone]      Name: Roxanne Perlasa JUSTICE Shields Dr: Everette Augustine MD  : 3/21/1980    Age/Sex: 44 year old Female  Pt. Phone: [phone]  Exam Date: 2024  Procedure: US AXILLARY RIGHT SH(CPT=76882)   -----------------------------------------------------------------------------------------------------------------------------------------------                  Everette Augustine MD  [address] [place] [address]  [address] [facility] [address]      Interpretation   PROCEDURE:  US AXILLARY RIGHT SH(VDC=27541)     COMPARISON:  None.     INDICATIONS:  Z15.09 BRCA gene positive Z15.01 BRCA gene positive C50.919 Triple negative breast cancer (HCC) Z17.1 Malignant neoplasm of upper-outer quadrant of left breast*     TECHNIQUE:  Sonography was performed of the clinically requested area of interest.     PATIENT STATED HISTORY: (As transcribed by Technologist)           FINDINGS:    Right axillary and chest wall ultrasound was performed attention to the area of palpable abnormality which is near scar from prior mastectomy.  There was no suspicious ultrasound abnormality.  There were normal lymph nodes in the axilla which maintain                   =====  CONCLUSION:  There is no suspicious ultrasound abnormality.   There are normal lymph nodes in the axilla.        LOCATION:  [place]        Dictated by (CST): Kenneth Del Cid MD on 5/09/2024 at 1:23 PM       Finalized by (CST): Kenneth Del Cid MD on 5/09/2024 at 1:24 PM        Impression and Plan   1.   Invasive ductal carcinoma, left breast: Patient with estrogen and progesterone receptor negative and Her2 negative disease. She is clinically staged as T2N0M0. She is a carrier of a deleterious BRCA1 mutation. Patient received neoadjuvant chemotherapy with carboplatin, paclitaxel, and pembrolizumab x 4 cycles and 1 cycles of doxorubicin, cyclophosphamide, and pembrolizumab at [facility]. The last cycle was administered there on 07/19/2022. She did not pursue further anticancer therapy until she presented to me and received cycle 2 on 09/13/2022. She completed 3 cycles with me for a total of 4 cycles of AC. She then continued pembrolizumab. Bilateral mastectomy with left sentinel lymph node biopsy was performed; pathology showed no residual invasive disease.           Patient achieved a complete response with neoadjuvant therapy. Recommendations were made for patient to complete 1 year of pembrolizumab but patient declined.          There is no clinical evidence of recurrent disease. Continue active surveillance. There was a concern for a right axillary lymph node on examination but axillary ultrasound was normal and the palpable abnormality was likely surgical scar.    2.   BRCA1 mutation carrier: Patient has undergone prophylactic BSO.    Planned Follow Up   Patient will return for follow up in 6 months.    Electronically signed by:    Everette Augustine M.D.  System Medical Director of Oncology Services  [facility]

## 2024-05-09 NOTE — PROGRESS NOTES
Patient is here for 6 month follow up for Breast cancer. Denies any changes since last visit. Feeling well. History of bilateral mastectomy.     Education Record    Learner:  Patient    Disease / Diagnosis:  Breast cancer     Barriers / Limitations:  None   Comments:    Method:  Discussion   Comments:    General Topics:  Plan of care reviewed   Comments:    Outcome:  Shows understanding   Comments:

## 2024-05-12 NOTE — ADDENDUM NOTE
Addended by: DAREN HERNANDEZ on: 5/11/2024 08:26 PM     Modules accepted: Level of Service     actual

## 2024-09-26 ENCOUNTER — TELEPHONE (OUTPATIENT)
Dept: HEMATOLOGY/ONCOLOGY | Facility: HOSPITAL | Age: 44
End: 2024-09-26

## 2024-09-26 ENCOUNTER — OFFICE VISIT (OUTPATIENT)
Dept: HEMATOLOGY/ONCOLOGY | Facility: HOSPITAL | Age: 44
End: 2024-09-26
Attending: SPECIALIST
Payer: COMMERCIAL

## 2024-09-26 VITALS
BODY MASS INDEX: 34 KG/M2 | DIASTOLIC BLOOD PRESSURE: 106 MMHG | TEMPERATURE: 98 F | WEIGHT: 215 LBS | OXYGEN SATURATION: 97 % | HEART RATE: 106 BPM | SYSTOLIC BLOOD PRESSURE: 156 MMHG | RESPIRATION RATE: 18 BRPM

## 2024-09-26 DIAGNOSIS — Z80.3 FAMILY HISTORY OF MALIGNANT NEOPLASM OF BREAST: Primary | ICD-10-CM

## 2024-09-26 DIAGNOSIS — Z15.01 BRCA GENE POSITIVE: ICD-10-CM

## 2024-09-26 DIAGNOSIS — Z15.09 BRCA GENE POSITIVE: ICD-10-CM

## 2024-09-26 PROCEDURE — G2211 COMPLEX E/M VISIT ADD ON: HCPCS | Performed by: SPECIALIST

## 2024-09-26 PROCEDURE — 99212 OFFICE O/P EST SF 10 MIN: CPT | Performed by: SPECIALIST

## 2024-09-26 NOTE — PROGRESS NOTES
[facility] Progress Note      Patient Name: Perla Oliveira   YOB: 1980  Medical Record Number: [id number]  Attending Physician: Everette Augustine M.D.     The 21st Century Cures Act makes medical notes like these available to patients in the interest of transparency. Please be advised this is a medical document. Medical documents are intended to carry relevant information, facts as evident, and the clinical opinion of the practitioner. The medical note is intended as peer to peer communication and may appear blunt or direct. It is written in medical language and may contain abbreviations or verbiage that are unfamiliar.     Date of Visit: 9/26/2024     Chief Complaint  Invasive ductal carcinoma, left breast - follow up.    Oncologic History  Perla Oliveira is a 44 year old female known to be a carrier of a deleterious BRCA1 mutation who on 03/22/2022 underwent bilateral mammography and ultrasound for a self palpated mass in the left breast which showed a 2.2 cm irregular hypoechoic mass in the upper outer quadrant; no axillary adenopathy was noted. Ultrasound guided biopsy of the left breast was performed on 03/23/2022 and showed grade 3 invasive ductal carcinoma. Immunohistochemical studies showed the following: estrogen receptor 0% positive, progesterone receptor 0% positive, Her2 1+, and Ki67 80%. CT chest, abdomen, pelvis w contrast on 04/12/2022 showed the left breast mass; irregular sclerotic lesions within the manubrium, bilateral iliac bones, and punctate foci within the thoracic spine and pelvic bones; an indeterminate right upper lobe nodule, and a 3 cm ovoid mass arising from the uterus likely representing a subserosal fibroid. Bone scan on 04/12/2022 showed mild uptake within the manubrium and mild uptake within the xiphoid process. Of note, patient had a CT chest w contrast on 03/14/2016, the report of which describes an apparent bone island in the sternum. She was  clinically staged as T2N0M0.     On 04/19/2022 patient began neoadjuvant therapy with carboplatin, paclitaxel, and pembrolizumab followed by doxorubicin, cyclophosphamide, and pembrolizumab starting on 07/19/2022. Patient states that beginning on day 1 of first AC + pembrolizumab, she felt \"poisoned.\" She is unable to characterize what she felt more than that. After that, for the following 2 weeks, she felt generally weak and was not eating much. She also developed profound neutropenia, though without fever, that frightened her. Patient was due for cycle 2 of doxorubicin, cyclophosphamide, and pembrolizumab on 08/09/2022 but cancelled the appointment.     She next presented to me for consultation on 09/07/2022. On 09/13/2022 she began cycle 2 with doxorubicin, cyclophosphamide, pembrolizumab.     On 11/28/2022 she underwent bilateral mastectomy and left sentinel lymph node biopsy. Pathology showed no malignancy in the right breast. In the left breast, there was no residual invasive carcinoma, there was two tiny foci of DCIS, and 3 lymph nodes negative for metastatic disease (0/3).     Following surgery on 12/06/2022 she received pembrolizumab. She was due for therapy on 12/27/2023 but failed to present for her appointment. Patient states that she sent a message to our nurse practitioner informing her that she had been diagnosed with COVID and wouldn't be coming in. However, we have no such record of that message. Our MA called the patient and left a voicemail which the patient states she never received. Patient reports that 1 week after the diagnosis of COVID she developed severe diarrhea and abdominal pain for which she saw her primary care physician who treated her with symptomatic therapies. Patient never contacted us about her symptoms or anything else until she scheduled an appointment with me on 02/16/2023. Patient states that she believes that her symptoms were related to pembrolizumab. She states that since  discontinuing pembrolizumab, the reflux she developed during chemotherapy resolved.     Although completion of post-operative pembrolizumab was recommended, patient decided against further systemic therapy.     History of Present Illness  Patient returns for follow up earlier than planned with a concern that she feels a lymph node in the right axilla.      Performance Status   Karnofsky 100% - Normal, no complaints.    Past Medical History (historical data, reviewed by physician)  Invasive ductal carcinoma, left breast (as above); migraine headaches.     Past Surgical History (historical data, reviewed by physician)  Prophylactic bilateral salpingo-oophorectomy.     Family History (historical data, reviewed by physician)  Mother with breast cancer age 32 and 36 and is BRCA1 mutation positive; sister is BRCA1 mutation positive. Maternal grandfather with prostate cancer. Father with colorectal cancer. No family history of pancreatic cancer.     Social History (historical data, reviewed by physician)  Denies tobacco use.      Current Medications  No outpatient medications have been marked as taking for the 9/26/24 encounter (Office Visit) with Everette Augustine MD.     Allergies   Ms. Oliveira is allergic to medroxyprogesterone, penicillins, sesame oil, and gadolinium.     Vital Signs   BP (!) 156/106 (BP Location: Left arm, Patient Position: Sitting, Cuff Size: large)   Pulse 106   Temp 97.5 °F (36.4 °C) (Temporal)   Resp 18   Wt 97.5 kg (215 lb)   LMP 02/28/2015   SpO2 97%   BMI 33.75 kg/m²      Physical Examination   Constitutional      Well developed, well nourished. Appears close to chronological age. No apparent distress.   Head   Normocephalic and atraumatic.  Eyes   Conjunctiva clear; sclera anicteric.  ENMT                 External nose normal; external ears normal.  Neck   Supple; no masses.   Lymphatics  No cervical, supraclavicular, axillary adenopathy.   Chest   Bilateral mastectomy without  reconstruction; keloid formation bilaterally.   Respiratory          Normal effort; no respiratory distress; clear to auscultation bilaterally.  Cardiovascular  Regular rate and rhythm.   Abdomen  Soft; not tender; no masses; no hepatosplenomegaly.   Extremities  No lower extremity edema.   Neurologic           Motor and sensory grossly intact.  Psychiatric          Mood and affect appropriate.    Laboratory   No results found for this or any previous visit (from the past 48 hour(s)).    Impression and Plan   1.   Invasive ductal carcinoma, left breast: Patient with estrogen and progesterone receptor negative and Her2 negative disease. She is clinically staged as T2N0M0. She is a carrier of a deleterious BRCA1 mutation. Patient received neoadjuvant chemotherapy with carboplatin, paclitaxel, and pembrolizumab x 4 cycles and 1 cycles of doxorubicin, cyclophosphamide, and pembrolizumab at [facility]. The last cycle was administered there on 07/19/2022. She did not pursue further anticancer therapy until she presented to me and received cycle 2 on 09/13/2022. She completed 3 cycles with me for a total of 4 cycles of AC. She then continued pembrolizumab. Bilateral mastectomy with left sentinel lymph node biopsy was performed; pathology showed no residual invasive disease.           Patient achieved a complete response with neoadjuvant therapy. Recommendations were made for patient to complete 1 year of pembrolizumab but patient declined.          There is no clinical evidence of recurrent disease. Continue active surveillance.     2.   BRCA1 mutation carrier: Patient has undergone prophylactic BSO.    Planned Follow Up   Patient will return for follow up in 6 months.    Electronically signed by:    Everette Augustine M.D.  [facility] Medical Director of Oncology Services  [facility]

## 2024-09-26 NOTE — PROGRESS NOTES
Education Record    Learner:  Patient    Disease / Diagnosis: hx triple negative IDC breast cancer   BRCA1 mutation       Barriers / Limitations:  None   Comments:    Method:  Discussion   Comments:    General Topics:  Plan of care reviewed   Comments:    Outcome:  Shows understanding   Comments:   Pt called about feeling a LN on the right side asking to be seen for examination. She saw her surgeon 9/11 and was told it was normal scar tissue, her sensation has changed, and she feels something there   Also noted something on her leg.

## 2024-09-26 NOTE — TELEPHONE ENCOUNTER
----- Message from Everette Augustine sent at 9/26/2024  8:26 AM CDT -----  Regarding: FW: looking to schedule  Contact: [phone]  I can see her today at 3:30 in [place]. If she wants other day/time or PF before Wednesday, then APN. If neither, then can tell her that it is perfectly safe to wait until next Wednesday.  ----- Message -----  From: Karlene Salazar, REGINALD  Sent: 9/26/2024   8:11 AM CDT  To: Everette Augustine MD  Subject: FW: looking to schedule                          FYI - let me know if you want me to fit her in a slot. If not, I will tell we will see her next week  ----- Message -----  From: Perla Oliveira  Sent: 9/26/2024   7:06 AM CDT  To: River Fragoso  Subject: looking to schedule                              Hi, I found a lymph node that I am concerned about. Would it be possible to get in before next Wednesday (which I have scheduled) for an exam? Thank you.

## 2024-09-26 NOTE — TELEPHONE ENCOUNTER
Spoke with patient regarding request for earlier appointment. Dr. Augustine available to see patient at 3:30pm in [place] today, patient accepted appointment. RN added patient to today's schedule.

## 2024-10-02 ENCOUNTER — APPOINTMENT (OUTPATIENT)
Dept: HEMATOLOGY/ONCOLOGY | Age: 44
End: 2024-10-02
Attending: SPECIALIST
Payer: COMMERCIAL

## 2024-10-29 ENCOUNTER — APPOINTMENT (OUTPATIENT)
Dept: HEMATOLOGY/ONCOLOGY | Facility: HOSPITAL | Age: 44
End: 2024-10-29
Attending: SPECIALIST
Payer: COMMERCIAL

## 2025-03-10 ENCOUNTER — HOSPITAL ENCOUNTER (OUTPATIENT)
Age: 45
Discharge: HOME | End: 2025-03-10
Payer: COMMERCIAL

## 2025-03-10 VITALS
SYSTOLIC BLOOD PRESSURE: 150 MMHG | OXYGEN SATURATION: 97 % | WEIGHT: 220 LBS | TEMPERATURE: 98 F | BODY MASS INDEX: 35.36 KG/M2 | HEART RATE: 103 BPM | RESPIRATION RATE: 18 BRPM | HEIGHT: 66 IN | DIASTOLIC BLOOD PRESSURE: 88 MMHG

## 2025-03-10 DIAGNOSIS — H65.92 OME (OTITIS MEDIA WITH EFFUSION), LEFT: Primary | ICD-10-CM

## 2025-03-10 PROCEDURE — 99213 OFFICE O/P EST LOW 20 MIN: CPT | Performed by: PHYSICIAN ASSISTANT

## 2025-03-10 RX ORDER — MULTIVITAMIN WITH IRON
50 TABLET ORAL DAILY
COMMUNITY

## 2025-03-10 RX ORDER — FOLIC ACID 1 MG/1
TABLET ORAL DAILY
COMMUNITY

## 2025-03-10 NOTE — ED PROVIDER NOTES
Patient Seen in: [facility]      History     Chief Complaint   Patient presents with    Ear Problem     Entered by patient     Stated Complaint: Ear Problem    Subjective:   HPI    44-year-old female with past medical history as below presents to immediate care for evaluation of left ear pressure starting 5 days ago.  She states left ear feels full of water and has muffled hearing.  She denies ear pain, drainage, fever.        Objective:     Past Medical History:    BRCA1 positive    BRCA2 positive    Breast cancer (HCC)    COVID    no residual effects     Esophageal reflux    Heterozygous for MTHFR gene mutation    History of COVID-19    symptoms: cough, congestion, fever, loss of smell; s/s resolved; not hospitalized    HPV (human papilloma virus) infection    Migraine    rare every 3 years    Migraines    1-2 x a year     Personal history of antineoplastic chemotherapy    Pneumonia due to organism    Visual impairment    glasses              Past Surgical History:   Procedure Laterality Date    Colposcopy, cervix w/upper adjacent vagina; w/endocervical curettage      at age 19-20    Cyst aspiration right Right 2010    lipoma    Hysterectomy      Impact tooth rem bony w/comp Bilateral 2000    wisdom teeth x 4    Ivf package      Ivf package  2011      2011    Oophorectomy Bilateral 3/2015    oophorectomy    Bapch[place] teeth removed                  Social History     Socioeconomic History    Marital status: Single     Spouse name: Nino    Number of children: 1    Years of education: 20   Occupational History    Occupation:      Comment: [employer]   Tobacco Use    Smoking status: Never    Smokeless tobacco: Never   Vaping Use    Vaping status: Never Used   Substance and Sexual Activity    Alcohol use: Yes     Alcohol/week: 1.0 standard drink of alcohol     Types: 1 Standard drinks or equivalent per week     Comment: one monthly    Drug use: No    Sexual activity: Yes      Partners: Male     Birth control/protection: Surgical   Other Topics Concern     Service No    Blood Transfusions No    Caffeine Concern Yes     Comment: coffee 2 cups/day    Occupational Exposure No    Hobby Hazards No    Sleep Concern No    Stress Concern No    Weight Concern No    Special Diet No    Back Care No    Exercise Yes     Comment: [employer]    Bike Helmet No    Seat Belt Yes    Self-Exams Yes   Social History Narrative    Lives with family    Enjoys dogs     Social Drivers of Health      [facility]    Housing Stability              Review of Systems    Positive for stated complaint: Ear Problem  Other systems are as noted in HPI.  Constitutional and vital signs reviewed.      All other systems reviewed and negative except as noted above.    Physical Exam     ED Triage Vitals [03/10/25 1804]   /88   Pulse 103   Resp 18   Temp 98 °F (36.7 °C)   Temp src Oral   SpO2 97 %   O2 Device None (Room air)       Current Vitals:   No data recorded      Physical Exam  Vitals and nursing note reviewed.   Constitutional:       General: She is not in acute distress.     Appearance: Normal appearance. She is not ill-appearing, toxic-appearing or diaphoretic.   HENT:      Right Ear: No middle ear effusion. Tympanic membrane is not injected, erythematous or bulging.      Left Ear: A middle ear effusion is present. Tympanic membrane is not injected, erythematous or bulging.   Cardiovascular:      Rate and Rhythm: Normal rate.   Pulmonary:      Effort: Pulmonary effort is normal. No respiratory distress.   Neurological:      Mental Status: She is alert and oriented to person, place, and time.   Psychiatric:         Behavior: Behavior normal.           ED Course   Labs Reviewed - No data to display            MDM   Differential diagnosis considered but not limited to otitis media with effusion, acute otitis media, otitis externa    Left ear exam  consistent with middle ear effusion.  Physical exam is otherwise unremarkable.  Patient education provided. Supportive care and return instructions discussed.         Medical Decision Making  Risk  OTC drugs.        Disposition and Plan     Clinical Impression:  1. OME (otitis media with effusion), left         Disposition:  Discharge  3/10/2025  6:36 pm    Follow-up:  [facility] [address]  [phone]    If symptoms worsen          Medications Prescribed:  Discharge Medication List as of 3/10/2025  6:37 PM              Supplementary Documentation:

## (undated) DEVICE — MEGADYNE E-Z CLEAN BLADE 4IN

## (undated) DEVICE — SUT VICRYL 2-0 J111T

## (undated) DEVICE — SLEEVE KENDALL SCD EXPRESS MED

## (undated) DEVICE — SUT SILK 2-0 FS 685G

## (undated) DEVICE — HEMOCLIP HORIZON MED 002200

## (undated) DEVICE — LAPAROTOMY SPONGE - RF AND X-RAY DETECTABLE PRE-WASHED: Brand: SITUATE

## (undated) DEVICE — SYRINGE 5ML LL TIP

## (undated) DEVICE — APPLICATOR CHLORAPREP 26ML

## (undated) DEVICE — STERILE POLYISOPRENE POWDER-FREE SURGICAL GLOVES: Brand: PROTEXIS

## (undated) DEVICE — EVACUATOR URO RELIVAC 100CC

## (undated) DEVICE — UNDYED BRAIDED (POLYGLACTIN 910), SYNTHETIC ABSORBABLE SUTURE: Brand: COATED VICRYL

## (undated) DEVICE — CLOSURE EXOFIN 1.0ML

## (undated) DEVICE — BRA SURGICAL ELIZABETH PINK XL

## (undated) DEVICE — LIGHT HANDLE

## (undated) DEVICE — PROVE COVER: Brand: UNBRANDED

## (undated) DEVICE — DRAIN ROUND HUBLESS 15FR

## (undated) DEVICE — TIP BOVIE 4" MEGADYNE

## (undated) DEVICE — SUT ETHILON 3-0 FSL 1671H

## (undated) DEVICE — PENCIL TELESCOPE MEGADYNE SE

## (undated) DEVICE — STAPLER SKIN INSORB 2030

## (undated) DEVICE — BREAST-HERNIA-PORT CDS-LF: Brand: MEDLINE INDUSTRIES, INC.

## (undated) DEVICE — 3M™ STERI-STRIP™ REINFORCED ADHESIVE SKIN CLOSURES, R1548, 1 IN X 5 IN (25 MM X 125 MM), 4 STRIPS/ENVELOPE: Brand: 3M™ STERI-STRIP™

## (undated) DEVICE — SOLUTION  .9 1000ML BTL

## (undated) NOTE — ED AVS SNAPSHOT
[facility] Emergency Department    [place]  [address] [address]    Phone:  [phone]    Fax:  [phone]           Christal Russell   MRN: [id number]    Department:  [facility] Emergency Department   Date of Visit:  3/29 IF THERE IS ANY CHANGE OR WORSENING OF YOUR CONDITION, CALL YOUR PRIMARY CARE PHYSICIAN AT ONCE OR RETURN IMMEDIATELY TO THE EMERGENCY DEPARTMENT.     If you have been prescribed any medication(s), please fill your prescription right away and begin taking t

## (undated) NOTE — ED AVS SNAPSHOT
[facility] Emergency Department    [place]  [address] [address]    Phone:  [phone]    Fax:  [phone]           Adalid Jon   MRN: [id number]    Department:  [facility] Emergency Department   Date of Visit:  3/29 Click [email or web address]      Or call [phone]    If you have any problems with your follow-up, please call our  at [phone]    Si usted tiene algun problema con ferguson sequimiento, por favor llame a nuestro adminstrador de casos al ([facility]        Pharmacy Address Phone Number   [facility] [place] [address] ([address]) [place] ([address] [place] [address].  ([address] discharge instructions in [employer] by going to Visits < Admission Summaries. If you've been to the Emergency Department or your doctor's office, you can view your past visit information in [employer] by going to Visits < Visit Summaries. [employer] questions?